# Patient Record
Sex: MALE | Race: WHITE | Employment: FULL TIME | URBAN - METROPOLITAN AREA
[De-identification: names, ages, dates, MRNs, and addresses within clinical notes are randomized per-mention and may not be internally consistent; named-entity substitution may affect disease eponyms.]

---

## 2018-05-31 ENCOUNTER — OFFICE VISIT (OUTPATIENT)
Dept: INTERNAL MEDICINE CLINIC | Age: 28
End: 2018-05-31

## 2018-05-31 VITALS
HEART RATE: 53 BPM | WEIGHT: 234 LBS | SYSTOLIC BLOOD PRESSURE: 100 MMHG | BODY MASS INDEX: 31.01 KG/M2 | HEIGHT: 73 IN | OXYGEN SATURATION: 98 % | DIASTOLIC BLOOD PRESSURE: 72 MMHG

## 2018-05-31 DIAGNOSIS — M25.562 CHRONIC PAIN OF LEFT KNEE: Primary | ICD-10-CM

## 2018-05-31 DIAGNOSIS — G89.29 CHRONIC PAIN OF LEFT KNEE: Primary | ICD-10-CM

## 2018-05-31 DIAGNOSIS — Z13.6 SCREENING FOR CARDIOVASCULAR CONDITION: ICD-10-CM

## 2018-05-31 DIAGNOSIS — F41.9 ANXIETY: ICD-10-CM

## 2018-05-31 DIAGNOSIS — F33.0 MILD EPISODE OF RECURRENT MAJOR DEPRESSIVE DISORDER (HCC): ICD-10-CM

## 2018-05-31 DIAGNOSIS — R53.83 FATIGUE, UNSPECIFIED TYPE: ICD-10-CM

## 2018-05-31 PROCEDURE — 99205 OFFICE O/P NEW HI 60 MIN: CPT | Performed by: FAMILY MEDICINE

## 2018-05-31 ASSESSMENT — PATIENT HEALTH QUESTIONNAIRE - PHQ9
1. LITTLE INTEREST OR PLEASURE IN DOING THINGS: 0
2. FEELING DOWN, DEPRESSED OR HOPELESS: 1
SUM OF ALL RESPONSES TO PHQ9 QUESTIONS 1 & 2: 1
SUM OF ALL RESPONSES TO PHQ QUESTIONS 1-9: 1

## 2018-05-31 NOTE — PROGRESS NOTES
Subjective:      Patient ID: Dillon Sanford is a 28 y.o. male. HPI  Left Knee Pain  For about 5 months. Hurt right ankle on New Year's taking out trash- 3 foot drop and landed on side of foot. Went back about 1 week later for knee pain/swelling and had effusion drained off at Urgent Care. Started wearing a brace, which really helps. No clicking, locking. Sometimes feels like it might give out. Depression/Anxiety  Long h/o depression, starting at about age 16-13. Never on meds, but he did see a psychiatrist weekly. \"Butterflies in stomach\", gassy, diarrhea. Drinks [employer] to help him focus at work. Mother just dx'd with anxiety disorder and sister was dx'd with ADD. Pt always struggled in school- poor grades. Review of Systems   Constitutional: Positive for fatigue. Negative for fever and unexpected weight change. HENT: Negative for congestion, ear pain, hearing loss, postnasal drip, rhinorrhea, sinus pressure, sore throat and trouble swallowing. Eyes: Negative for pain, discharge, redness and visual disturbance. Respiratory: Negative for cough, chest tightness, shortness of breath and wheezing. Cardiovascular: Negative for chest pain, palpitations and leg swelling. Gastrointestinal: Positive for diarrhea. Negative for abdominal pain, constipation, nausea and vomiting. Endocrine: Negative for cold intolerance, heat intolerance, polydipsia, polyphagia and polyuria. Genitourinary: Negative for dysuria, flank pain, hematuria and testicular pain. Musculoskeletal: Positive for arthralgias and joint swelling. Negative for back pain and myalgias. Skin: Negative for color change and rash. Allergic/Immunologic: Negative for environmental allergies, food allergies and immunocompromised state. Neurological: Negative for dizziness, seizures, syncope, numbness and headaches. Hematological: Negative for adenopathy.    Psychiatric/Behavioral: Positive for decreased concentration and dysphoric mood. Negative for agitation, confusion and sleep disturbance. The patient is nervous/anxious. Objective:   Physical Exam   Constitutional: He is oriented to person, place, and time. He appears well-developed and well-nourished. No distress. HENT:   Head: Normocephalic and atraumatic. Right Ear: External ear normal.   Left Ear: External ear normal.   Mouth/Throat: Oropharynx is clear and moist.   Eyes: Conjunctivae are normal. Pupils are equal, round, and reactive to light. Neck: Normal range of motion. Neck supple. Carotid bruit is not present. No tracheal deviation present. No thyromegaly present. Cardiovascular: Normal rate, regular rhythm, normal heart sounds and intact distal pulses. No murmur heard. Pulmonary/Chest: Effort normal and breath sounds normal. No respiratory distress. He has no wheezes. He has no rales. Abdominal: Soft. Bowel sounds are normal. He exhibits no distension. There is no tenderness. Musculoskeletal: Normal range of motion. He exhibits no edema or tenderness. Left knee: He exhibits swelling and effusion. He exhibits no erythema, no LCL laxity and no MCL laxity. No medial joint line and no lateral joint line tenderness noted. Lymphadenopathy:     He has no cervical adenopathy. Neurological: He is alert and oriented to person, place, and time. He has normal reflexes. Skin: Skin is warm and dry. No rash noted. Psychiatric: He has a normal mood and affect. His behavior is normal.   Nursing note and vitals reviewed. Vitals:    05/31/18 1024   BP: 100/72   Pulse: 53   SpO2: 98%   Weight: 234 lb (106.1 kg)   Height: 6' 1\" (1.854 m)     Wt Readings from Last 3 Encounters:   06/28/18 230 lb 3.2 oz (104.4 kg)   06/08/18 224 lb (101.6 kg)   05/31/18 234 lb (106.1 kg)     BP Readings from Last 3 Encounters:   06/28/18 110/68   06/08/18 123/78   05/31/18 100/72     Body mass index is 30.87 kg/m². Assessment:       Diagnosis Orders   1.  Chronic pain of left knee  Martín Franklin MD (General)   2. Mild episode of recurrent major depressive disorder ([facility])  TSH with Reflex    Folate    Vitamin B12    Vitamin D 25 Hydroxy   3. Anxiety  TSH with Reflex    Folate    Vitamin B12    Vitamin D 25 Hydroxy   4. Fatigue, unspecified type  TSH with Reflex    CBC Auto Differential    Comprehensive Metabolic Panel    Folate    Lipid Panel    Vitamin B12    Vitamin D 25 Hydroxy   5. Screening for cardiovascular condition  Comprehensive Metabolic Panel    Lipid Panel           Plan:      Simon Adame was seen today for establish care and joint swelling. Diagnoses and all orders for this visit:    Chronic pain of left knee  -     Martín Franklin MD (General)    Mild episode of recurrent major depressive disorder ([facility] [address]  -     TSH with Reflex; Future  -     Folate; Future  -     Vitamin B12; Future  -     Vitamin D 25 Hydroxy; Future  -     sertraline (ZOLOFT) 50 MG tablet; Take 1 tablet by mouth daily    Anxiety  -     TSH with Reflex; Future  -     Folate; Future  -     Vitamin B12; Future  -     Vitamin D 25 Hydroxy; Future    Fatigue, unspecified type  -     TSH with Reflex; Future  -     CBC Auto Differential; Future  -     Comprehensive Metabolic Panel; Future  -     Folate; Future  -     Lipid Panel; Future  -     Vitamin B12; Future  -     Vitamin D 25 Hydroxy; Future    Screening for cardiovascular condition  -     Comprehensive Metabolic Panel; Future  -     Lipid Panel; Future        Patient Instructions     Start Zoloft 50 mg- take half a pill per day for 1 week, then if tolerating well, increase to a whole pill per day. Consider getting GeneSight testing done. Check with insurance re: cost.  Schedule appointment with Montana Do, counselor in this office- [phone]. Schedule appointment with Dr. Jc Pan at [place] for knee- [phone]. Get fasting labs drawn soon.     60 minutes were spent with the patient face-to-face, over half of which were spent in counseling and discussion of plan of care. Return in about 4 weeks (around 6/28/2018) for recheck anxiety.

## 2018-05-31 NOTE — PATIENT INSTRUCTIONS
Start Zoloft 50 mg- take half a pill per day for 1 week, then if tolerating well, increase to a whole pill per day. Consider getting GeneSight testing done. Check with insurance re: cost.  Schedule appointment with Kat Bañuelos, counselor in this office- [phone]. Schedule appointment with Dr. Jackson Demarco at [place] for knee- [phone]. Get fasting labs drawn soon. Return in about 4 weeks (around 6/28/2018) for recheck anxiety. Patient Education        Anxiety Disorder: Care Instructions  Your Care Instructions    Anxiety is a normal reaction to stress. Difficult situations can cause you to have symptoms such as sweaty palms and a nervous feeling. In an anxiety disorder, the symptoms are far more severe. Constant worry, muscle tension, trouble sleeping, nausea and diarrhea, and other symptoms can make normal daily activities difficult or impossible. These symptoms may occur for no reason, and they can affect your work, school, or social life. Medicines, counseling, and self-care can all help. Follow-up care is a key part of your treatment and safety. Be sure to make and go to all appointments, and call your doctor if you are having problems. It's also a good idea to know your test results and keep a list of the medicines you take. How can you care for yourself at home? · Take medicines exactly as directed. Call your doctor if you think you are having a problem with your medicine. · Go to your counseling sessions and follow-up appointments. · Recognize and accept your anxiety. Then, when you are in a situation that makes you anxious, say to yourself, \"This is not an emergency. I feel uncomfortable, but I am not in danger. I can keep going even if I feel anxious. \"  · Be kind to your body:  ¨ Relieve tension with exercise or a massage. ¨ Get enough rest.  ¨ Avoid alcohol, caffeine, nicotine, and illegal drugs. They can increase your anxiety level and cause sleep problems.   ¨ Learn and do relaxation techniques. See below for more about these techniques. · Engage your mind. Get out and do something you enjoy. Go to a funny movie, or take a walk or hike. Plan your day. Having too much or too little to do can make you anxious. · Keep a record of your symptoms. Discuss your fears with a good friend or family member, or join a support group for people with similar problems. Talking to others sometimes relieves stress. · Get involved in social groups, or volunteer to help others. Being alone sometimes makes things seem worse than they are. · Get at least 30 minutes of exercise on most days of the week to relieve stress. Walking is a good choice. You also may want to do other activities, such as running, swimming, cycling, or playing tennis or team sports. Relaxation techniques  Do relaxation exercises 10 to 20 minutes a day. You can play soothing, relaxing music while you do them, if you wish. · Tell others in your house that you are going to do your relaxation exercises. Ask them not to disturb you. · Find a comfortable place, away from all distractions and noise. · Lie down on your back, or sit with your back straight. · Focus on your breathing. Make it slow and steady. · Breathe in through your nose. Breathe out through either your nose or mouth. · Breathe deeply, filling up the area between your navel and your rib cage. Breathe so that your belly goes up and down. · Do not hold your breath. · Breathe like this for 5 to 10 minutes. Notice the feeling of calmness throughout your whole body. As you continue to breathe slowly and deeply, relax by doing the following for another 5 to 10 minutes:  · Tighten and relax each muscle group in your body. You can begin at your toes and work your way up to your head. · Imagine your muscle groups relaxing and becoming heavy. · Empty your mind of all thoughts. · Let yourself relax more and more deeply.   · Become aware of the state of calmness that surrounds have questions about a medical condition or this instruction, always ask your healthcare professional. [place] [address] any warranty or liability for your use of this information.

## 2018-06-08 ENCOUNTER — OFFICE VISIT (OUTPATIENT)
Dept: ORTHOPEDIC SURGERY | Age: 28
End: 2018-06-08

## 2018-06-08 VITALS
SYSTOLIC BLOOD PRESSURE: 123 MMHG | BODY MASS INDEX: 28.75 KG/M2 | HEIGHT: 74 IN | WEIGHT: 224 LBS | DIASTOLIC BLOOD PRESSURE: 78 MMHG | HEART RATE: 47 BPM

## 2018-06-08 DIAGNOSIS — M25.562 PAIN IN JOINT OF LEFT KNEE: Primary | ICD-10-CM

## 2018-06-08 DIAGNOSIS — M25.462 EFFUSION OF LEFT KNEE: ICD-10-CM

## 2018-06-08 DIAGNOSIS — S83.242A TEAR OF MEDIAL MENISCUS OF LEFT KNEE, UNSPECIFIED TEAR TYPE, UNSPECIFIED WHETHER OLD OR CURRENT TEAR, INITIAL ENCOUNTER: ICD-10-CM

## 2018-06-08 PROCEDURE — 99243 OFF/OP CNSLTJ NEW/EST LOW 30: CPT | Performed by: PHYSICIAN ASSISTANT

## 2018-06-08 RX ORDER — DICLOFENAC SODIUM 75 MG/1
75 TABLET, DELAYED RELEASE ORAL 2 TIMES DAILY
Qty: 60 TABLET | Refills: 3 | Status: SHIPPED | OUTPATIENT
Start: 2018-06-08 | End: 2020-11-05 | Stop reason: CLARIF

## 2018-06-22 ENCOUNTER — OFFICE VISIT (OUTPATIENT)
Dept: ORTHOPEDIC SURGERY | Age: 28
End: 2018-06-22

## 2018-06-22 ENCOUNTER — TELEPHONE (OUTPATIENT)
Dept: ORTHOPEDIC SURGERY | Age: 28
End: 2018-06-22

## 2018-06-22 DIAGNOSIS — M25.462 EFFUSION OF LEFT KNEE: Primary | ICD-10-CM

## 2018-06-22 LAB
REASON FOR REJECTION: NORMAL
REJECTED TEST: NORMAL

## 2018-06-22 PROCEDURE — 99213 OFFICE O/P EST LOW 20 MIN: CPT | Performed by: PHYSICIAN ASSISTANT

## 2018-06-28 ENCOUNTER — OFFICE VISIT (OUTPATIENT)
Dept: INTERNAL MEDICINE CLINIC | Age: 28
End: 2018-06-28

## 2018-06-28 VITALS
HEIGHT: 73 IN | OXYGEN SATURATION: 98 % | SYSTOLIC BLOOD PRESSURE: 110 MMHG | WEIGHT: 230.2 LBS | HEART RATE: 50 BPM | DIASTOLIC BLOOD PRESSURE: 68 MMHG | BODY MASS INDEX: 30.51 KG/M2

## 2018-06-28 DIAGNOSIS — F41.9 ANXIETY: ICD-10-CM

## 2018-06-28 DIAGNOSIS — F33.0 MAJOR DEPRESSIVE DISORDER, RECURRENT, MILD (HCC): Primary | ICD-10-CM

## 2018-06-28 PROCEDURE — 99213 OFFICE O/P EST LOW 20 MIN: CPT | Performed by: FAMILY MEDICINE

## 2018-06-28 ASSESSMENT — ENCOUNTER SYMPTOMS
DIARRHEA: 0
EYE DISCHARGE: 0
EYE PAIN: 0
VOMITING: 0
CONSTIPATION: 0
CHEST TIGHTNESS: 0
WHEEZING: 0
COLOR CHANGE: 0
SORE THROAT: 0
TROUBLE SWALLOWING: 0
RHINORRHEA: 0
SINUS PRESSURE: 0
SHORTNESS OF BREATH: 0
NAUSEA: 0
ABDOMINAL PAIN: 0
EYE REDNESS: 0
BACK PAIN: 0
COUGH: 0

## 2018-06-29 ENCOUNTER — HOSPITAL ENCOUNTER (OUTPATIENT)
Dept: OTHER | Age: 28
Discharge: HOME | End: 2018-06-29
Attending: FAMILY MEDICINE | Admitting: FAMILY MEDICINE

## 2018-06-29 LAB
A/G RATIO: 1.6 (ref 1.1–2.2)
ALBUMIN SERPL-MCNC: 4.4 G/DL (ref 3.4–5)
ALP BLD-CCNC: 65 U/L (ref 40–129)
ALT SERPL-CCNC: 54 U/L (ref 10–40)
ANION GAP SERPL CALCULATED.3IONS-SCNC: 9 MMOL/L (ref 3–16)
AST SERPL-CCNC: 37 U/L (ref 15–37)
BASOPHILS ABSOLUTE: 0.1 K/UL (ref 0–0.2)
BASOPHILS RELATIVE PERCENT: 1 %
BILIRUB SERPL-MCNC: 0.5 MG/DL (ref 0–1)
BUN BLDV-MCNC: 16 MG/DL (ref 7–20)
CALCIUM SERPL-MCNC: 9 MG/DL (ref 8.3–10.6)
CHLORIDE BLD-SCNC: 105 MMOL/L (ref 99–110)
CHOLESTEROL, TOTAL: 147 MG/DL (ref 0–199)
CO2: 28 MMOL/L (ref 21–32)
CREAT SERPL-MCNC: 0.7 MG/DL (ref 0.9–1.3)
EOSINOPHILS ABSOLUTE: 0.2 K/UL (ref 0–0.6)
EOSINOPHILS RELATIVE PERCENT: 3.1 %
FOLATE: 9.53 NG/ML (ref 4.78–24.2)
GFR AFRICAN AMERICAN: >60
GFR NON-AFRICAN AMERICAN: >60
GLOBULIN: 2.7 G/DL
GLUCOSE BLD-MCNC: 93 MG/DL (ref 70–99)
HCT VFR BLD CALC: 42.8 % (ref 40.5–52.5)
HDLC SERPL-MCNC: 46 MG/DL (ref 40–60)
HEMOGLOBIN: 14.4 G/DL (ref 13.5–17.5)
LDL CHOLESTEROL CALCULATED: 88 MG/DL
LYMPHOCYTES ABSOLUTE: 1.9 K/UL (ref 1–5.1)
LYMPHOCYTES RELATIVE PERCENT: 34.9 %
MCH RBC QN AUTO: 28.9 PG (ref 26–34)
MCHC RBC AUTO-ENTMCNC: 33.6 G/DL (ref 31–36)
MCV RBC AUTO: 86.1 FL (ref 80–100)
MONOCYTES ABSOLUTE: 0.4 K/UL (ref 0–1.3)
MONOCYTES RELATIVE PERCENT: 6.7 %
NEUTROPHILS ABSOLUTE: 2.9 K/UL (ref 1.7–7.7)
NEUTROPHILS RELATIVE PERCENT: 54.3 %
PDW BLD-RTO: 13.8 % (ref 12.4–15.4)
PLATELET # BLD: 190 K/UL (ref 135–450)
PMV BLD AUTO: 10.7 FL (ref 5–10.5)
POTASSIUM SERPL-SCNC: 4.4 MMOL/L (ref 3.5–5.1)
RBC # BLD: 4.97 M/UL (ref 4.2–5.9)
SODIUM BLD-SCNC: 142 MMOL/L (ref 136–145)
TOTAL PROTEIN: 7.1 G/DL (ref 6.4–8.2)
TRIGL SERPL-MCNC: 66 MG/DL (ref 0–150)
TSH REFLEX: 2.03 UIU/ML (ref 0.27–4.2)
VITAMIN B-12: 427 PG/ML (ref 211–911)
VITAMIN D 25-HYDROXY: 21.7 NG/ML
VLDLC SERPL CALC-MCNC: 13 MG/DL
WBC # BLD: 5.4 K/UL (ref 4–11)

## 2018-07-02 ENCOUNTER — TELEPHONE (OUTPATIENT)
Dept: ORTHOPEDIC SURGERY | Age: 28
End: 2018-07-02

## 2018-07-02 NOTE — TELEPHONE ENCOUNTER
Patient's synovasure results to date were negative for infection. We are still waiting for final medial cultures which could take up to another week. Have spoke to the patient and he will attempt to get an appointment next week but if unable to we can give him the results over the phone.

## 2018-07-05 PROBLEM — E55.9 VITAMIN D DEFICIENCY: Status: ACTIVE | Noted: 2018-07-05

## 2018-07-09 ENCOUNTER — TELEPHONE (OUTPATIENT)
Dept: ORTHOPEDIC SURGERY | Age: 28
End: 2018-07-09

## 2018-07-09 ASSESSMENT — ENCOUNTER SYMPTOMS
COLOR CHANGE: 0
WHEEZING: 0
CHEST TIGHTNESS: 0
DIARRHEA: 1
EYE PAIN: 0
EYE REDNESS: 0
CONSTIPATION: 0
SORE THROAT: 0
TROUBLE SWALLOWING: 0
NAUSEA: 0
EYE DISCHARGE: 0
ABDOMINAL PAIN: 0
SINUS PRESSURE: 0
RHINORRHEA: 0
SHORTNESS OF BREATH: 0
VOMITING: 0
BACK PAIN: 0
COUGH: 0

## 2018-07-10 DIAGNOSIS — M22.2X2 PATELLOFEMORAL PAIN SYNDROME OF LEFT KNEE: Primary | ICD-10-CM

## 2018-08-28 ENCOUNTER — OFFICE VISIT (OUTPATIENT)
Dept: INTERNAL MEDICINE CLINIC | Age: 28
End: 2018-08-28

## 2018-08-28 VITALS
HEART RATE: 73 BPM | OXYGEN SATURATION: 97 % | WEIGHT: 229 LBS | BODY MASS INDEX: 30.21 KG/M2 | SYSTOLIC BLOOD PRESSURE: 126 MMHG | DIASTOLIC BLOOD PRESSURE: 82 MMHG

## 2018-08-28 DIAGNOSIS — F41.9 ANXIETY: ICD-10-CM

## 2018-08-28 DIAGNOSIS — F33.0 MAJOR DEPRESSIVE DISORDER, RECURRENT, MILD (HCC): Primary | ICD-10-CM

## 2018-08-28 DIAGNOSIS — S61.219A LACERATION OF FINGER WITHOUT FOREIGN BODY WITHOUT DAMAGE TO NAIL, UNSPECIFIED FINGER, UNSPECIFIED LATERALITY, INITIAL ENCOUNTER: ICD-10-CM

## 2018-08-28 PROCEDURE — 99213 OFFICE O/P EST LOW 20 MIN: CPT | Performed by: FAMILY MEDICINE

## 2018-08-28 RX ORDER — ESCITALOPRAM OXALATE 10 MG/1
10 TABLET ORAL DAILY
Qty: 30 TABLET | Refills: 1 | Status: SHIPPED | OUTPATIENT
Start: 2018-08-28 | End: 2020-11-05 | Stop reason: CLARIF

## 2018-08-28 ASSESSMENT — ENCOUNTER SYMPTOMS
SORE THROAT: 0
CHEST TIGHTNESS: 0
EYE DISCHARGE: 0
NAUSEA: 0
RHINORRHEA: 0
CONSTIPATION: 0
BACK PAIN: 0
VOMITING: 0
EYE PAIN: 0
DIARRHEA: 0
SINUS PRESSURE: 0
ABDOMINAL PAIN: 0
TROUBLE SWALLOWING: 0
EYE REDNESS: 0
WHEEZING: 0
COUGH: 0
COLOR CHANGE: 0
SHORTNESS OF BREATH: 0

## 2018-08-28 NOTE — PROGRESS NOTES
Gastrointestinal: Negative for abdominal pain, constipation, diarrhea, nausea and vomiting. Endocrine: Negative for cold intolerance, heat intolerance, polydipsia, polyphagia and polyuria. Genitourinary: Negative for dysuria, flank pain, hematuria and testicular pain. Musculoskeletal: Negative for arthralgias, back pain, joint swelling and myalgias. Skin: Positive for wound. Negative for color change and rash. Allergic/Immunologic: Negative for environmental allergies, food allergies and immunocompromised state. Neurological: Negative for dizziness, seizures, syncope, numbness and headaches. Hematological: Negative for adenopathy. Psychiatric/Behavioral: Positive for dysphoric mood. Negative for agitation, confusion and sleep disturbance. The patient is not nervous/anxious. Objective:   Physical Exam    Wt Readings from Last 3 Encounters:   08/28/18 229 lb (103.9 kg)   06/28/18 230 lb 3.2 oz (104.4 kg)   06/08/18 224 lb (101.6 kg)       BP Readings from Last 3 Encounters:   08/28/18 126/82   06/28/18 110/68   06/08/18 123/78       Vitals:    08/28/18 1519   BP: 126/82   Pulse: 73   SpO2: 97%       Physical Exam  Nursing note and vitals reviewed. Vitals:    08/28/18 1519   BP: 126/82   Site: Right Arm   Position: Sitting   Cuff Size: Large Adult   Pulse: 73   SpO2: 97%   Weight: 229 lb (103.9 kg)     Wt Readings from Last 3 Encounters:   08/28/18 229 lb (103.9 kg)   06/28/18 230 lb 3.2 oz (104.4 kg)   06/08/18 224 lb (101.6 kg)     BP Readings from Last 3 Encounters:   08/28/18 126/82   06/28/18 110/68   06/08/18 123/78     Body mass index is 30.21 kg/m². Constitutional: Patient appears well-developed and well-nourished. No distress. Head: Normocephalic and atraumatic. Oropharyngeal exam: mucous membranes moist, pharynx normal without lesions. Neck: Normal range of motion. Neck supple. No thyroidmegaly.    Cardiovascular: Normal rate, regular rhythm, normal heart sounds and intact

## 2018-08-31 ENCOUNTER — OFFICE VISIT (OUTPATIENT)
Dept: PSYCHOLOGY | Age: 28
End: 2018-08-31

## 2018-08-31 DIAGNOSIS — F33.1 MDD (MAJOR DEPRESSIVE DISORDER), RECURRENT EPISODE, MODERATE (HCC): Primary | ICD-10-CM

## 2018-08-31 PROCEDURE — 90791 PSYCH DIAGNOSTIC EVALUATION: CPT | Performed by: SOCIAL WORKER

## 2018-08-31 ASSESSMENT — PATIENT HEALTH QUESTIONNAIRE - PHQ9
5. POOR APPETITE OR OVEREATING: 0
1. LITTLE INTEREST OR PLEASURE IN DOING THINGS: 2
SUM OF ALL RESPONSES TO PHQ9 QUESTIONS 1 & 2: 4
SUM OF ALL RESPONSES TO PHQ QUESTIONS 1-9: 15
6. FEELING BAD ABOUT YOURSELF - OR THAT YOU ARE A FAILURE OR HAVE LET YOURSELF OR YOUR FAMILY DOWN: 3
4. FEELING TIRED OR HAVING LITTLE ENERGY: 2
8. MOVING OR SPEAKING SO SLOWLY THAT OTHER PEOPLE COULD HAVE NOTICED. OR THE OPPOSITE, BEING SO FIGETY OR RESTLESS THAT YOU HAVE BEEN MOVING AROUND A LOT MORE THAN USUAL: 3
SUM OF ALL RESPONSES TO PHQ QUESTIONS 1-9: 15
3. TROUBLE FALLING OR STAYING ASLEEP: 0
2. FEELING DOWN, DEPRESSED OR HOPELESS: 2
10. IF YOU CHECKED OFF ANY PROBLEMS, HOW DIFFICULT HAVE THESE PROBLEMS MADE IT FOR YOU TO DO YOUR WORK, TAKE CARE OF THINGS AT HOME, OR GET ALONG WITH OTHER PEOPLE: 2
7. TROUBLE CONCENTRATING ON THINGS, SUCH AS READING THE NEWSPAPER OR WATCHING TELEVISION: 3

## 2018-08-31 NOTE — PROGRESS NOTES
connections  Insight    Good  Judgment    Intact  Orientation    oriented to person, place, time, and general circumstances  Memory    recent and remote memory intact  Attention/Concentration    intact  Morbid ideation Yes  Suicide Assessment    no suicidal ideation      History:    Medications:   Current Outpatient Prescriptions   Medication Sig Dispense Refill    escitalopram (LEXAPRO) 10 MG tablet Take 1 tablet by mouth daily 30 tablet 1    sertraline (ZOLOFT) 50 MG tablet Take 1.5-2 tablets by mouth daily 60 tablet 5    diclofenac (VOLTAREN) 75 MG EC tablet Take 1 tablet by mouth 2 times daily 60 tablet 3    acetaminophen-codeine (TYLENOL/CODEINE #3) 300-30 MG per tablet Take 1 tablet by mouth every 4 hours as needed. 10 tablet 0     No current facility-administered medications for this visit. Social History:   Social History     Social History    Marital status:      Spouse name: N/A    Number of children: N/A    Years of education: N/A     Occupational History    patio room       Social History Main Topics    Smoking status: Never Smoker    Smokeless tobacco: Never Used    Alcohol use No    Drug use: No    Sexual activity: Yes     Partners: Female     Birth control/ protection: Condom     Other Topics Concern    Not on file     Social History Narrative    No narrative on file       TOBACCO:   reports that he has never smoked. He has never used smokeless tobacco.  ETOH:   reports that he does not drink alcohol. Family History:   Family History   Problem Relation Age of Onset    Other Mother    Lashawn Murguia Sclerosis Mother      [facility] Scores 8/31/2018 5/31/2018   PHQ2 Score 4 1   PHQ9 Score 15 1     Interpretation of Total Score Depression Severity: 1-4 = Minimal depression, 5-9 = Mild depression, 10-14 = Moderate depression, 15-19 = Moderately severe depression, 20-27 = Severe depression      A:  Pt endorses long hx of depression. Stress with work.   Irritability, lack of

## 2018-08-31 NOTE — PATIENT INSTRUCTIONS
depression. This may be different for everyone and it doesnt matter if its gardening, playing bridge, walking, reading a novel, or simply talking to a close friend. What matters is that YOU find the activity pleasurable! Even if you dont feel like doing something pleasurable for yourself, DO IT ANYWAY. We call this the fake it until you make it principle. Educate yourself! Often people feel powerless against medical conditions because they do not understand what is happening in their body. Just by reading this handout you know more than most people about depression. Knowledge is power when you can apply it, and make yourself feel better. *See recommended reading list at the bottom of this handout    Begin to notice unhealthy and unhelpful thoughts! In addition to how we behave, how we think influences our mood directly. Notice recurrent or alarming thoughts that have an impact on your mood. Ask yourself is this type of thinking helping me or hurting me?  if your answer is its hurting me here are some things you can do: *see disputation of negative thoughts section of handout  - Challenge the negative thought. Is it truly accurate? Wheres the proof? Become your own  and collect the facts.  - Reframe the negative thought. How can I think differently about this problem, situation, or view of myself? Allow yourself to view a situation from more than one angle, how might my spouse, friend, or someone I admire view this same problem?  - Use the best friend scenario. How would you help your best friend if he or she was having these same thoughts? Would you criticize him or her as harshly as you criticize yourself? Remember, YOU know YOU better than anyone else. You likely know what kinds of activities, thoughts and reinforcement you respond to. Doing whats easiest and most doable is the key. Pick 1 or 2 things that are easy and get started feeling better TODAY!     * Use the experiences that show that this thought might not be totally true? 12. If my best friend or someone I loved had this thought, what would I tell them? 15. If my best friend or someone I loved knew I was thinking this thought, what would they say to me? What evidence would they point out to me that would suggest that my thought is not completely true? 14. Are there strengths in me or positives in the situation that I am ignoring? Am I underestimating my ability to cope with unfortunate circumstances? 15. When I am not feeling this way, do I think about this situation any differently? How?  16. Have I been in this type of situation before? What happened? What have I learned from prior experiences that could help me now? 17. Five years from now, if I look back on this situation, will I look at it any differently? Will I focus on any different part of my experience? 25. Am I blaming myself for something over which I do not have complete control? OVERCOMING DEPRESSION    This booklet is designed to provide information about strategies for overcoming depression. It discusses a model or framework for understanding depression (the depression spiral) and presents an overview of treatment of depression, including use of medication and strategic coping strategies. The booklet is designed to be used with the assistance of your Behavioral Health Consultant. The Depression Spiral    The figure below depicts one helpful way to think about and understand depression. Our life experience (including depression) is influenced by a number of interrelated factors: our environment, biological factors, our thoughts and beliefs, our behaviors, and our emotions. Each factor can affect the others. For example, Lamine Villa recently began working in a fast-paced, high-pressure job (environmental factor). She began to have thoughts such as Theres no way I can get all this work done. Its impossible.   If I dont get it done, I may lose my job.   As a result, she began to work longer hours, cut out all fun activities, and withdraw from family and friends (behaviors). With this decrease in many of the positive, rewarding aspects of her life, she began to feel down, depressed, and more irritable (emotions). As the depression cycle started to take hold, she had more difficulty sleeping and concentrating (biology), which led to even more irritability and depression (emotions) and further withdrawal from activities and people (behaviors). At some point in the cycle, the balance of chemicals in her brain also began to alter (biology), which further deepened the spiral of depression. BREAKING THE DEPRESSION SPIRAL      As you can see, a variety of factors, including thoughts, behaviors, emotions, and environmental and biological factors can cause, maintain, and worsen depression. Fortunately, there are effective ways of breaking the spiral of depression. Since all the factors are interrelated (with one aspect affecting the others), even making small changes in just one or two areas can gradually lead to significant improvements in other areas. For example, Dinah Garcia noticed her worsening moods and decided to take action to break the depression spiral.  She focused first on one area that she felt would be easy to change:  her behaviors. Specifically, she wanted to make changes in how she spent her time in the evenings after work. She made a goal of spending 30 minutes each evening relaxing and doing fun activities with her family (behaviors). After several weeks, she noticed that she was beginning to feel lower levels of stress and her sleep began to improve (biological factor). Feeling more rested and better able to concentrate (biological factors) increased her belief that she could effectively manage the demands of her new job (thoughts). She noticed that she had fewer days of feeling down and depressed (emotions).       Other people working to improve their depression may choose to focus initially on other areas. Some people benefit from starting an antidepressant medication (biological factor) to begin to break the depression cycle. Others focus first on increasing regular physical exercise (a behavioral and biological factor that may help decrease depression), recognizing and changing thought patterns that contribute to depression or worry (thoughts), or learning and trying out new behaviors to improve work or family situations (behaviors, e.g., problem-solving, effective communication, time management, etc.). As you can see, there are a variety of coping methods and behavioral strategies that may be helpful in decreasing depression. It is probably not in your best interests to try all or too many strategies at any one time. Rather, keep it simple and do not overwhelm yourself. It is usually best to pick one or two strategies that sound most relevant to you, try those coping strategies for a few weeks or longer, and then move on to other coping strategies that you think may be important later on. And remember -- even if you are just working directly on one or two coping strategies, you will probably be having an indirect positive effect on other areas. Your Behavioral Health Consultant [facility]) can work with you to develop skills in some of the most effective strategies for breaking the depression spiral and decreasing depression. Four effective strategies include:    _____  a. Increasing rewarding activities    _____  b. Taking antidepressant medication as directed by PCP     _____  c. Increasing physical exercise     _____  d. Increasing balanced thinking     Talk with your [facility] about which of the above you are interested in working on to better manage your depression.

## 2018-09-24 ENCOUNTER — OFFICE VISIT (OUTPATIENT)
Dept: PSYCHOLOGY | Age: 28
End: 2018-09-24
Payer: COMMERCIAL

## 2018-09-24 DIAGNOSIS — F33.1 MDD (MAJOR DEPRESSIVE DISORDER), RECURRENT EPISODE, MODERATE (HCC): Primary | ICD-10-CM

## 2018-09-24 PROCEDURE — 99999 PR OFFICE/OUTPT VISIT,PROCEDURE ONLY: CPT | Performed by: SOCIAL WORKER

## 2018-09-24 PROCEDURE — 90832 PSYTX W PT 30 MINUTES: CPT | Performed by: SOCIAL WORKER

## 2018-09-24 ASSESSMENT — PATIENT HEALTH QUESTIONNAIRE - PHQ9
SUM OF ALL RESPONSES TO PHQ QUESTIONS 1-9: 16
3. TROUBLE FALLING OR STAYING ASLEEP: 2
4. FEELING TIRED OR HAVING LITTLE ENERGY: 3
5. POOR APPETITE OR OVEREATING: 0
7. TROUBLE CONCENTRATING ON THINGS, SUCH AS READING THE NEWSPAPER OR WATCHING TELEVISION: 2
SUM OF ALL RESPONSES TO PHQ QUESTIONS 1-9: 16
2. FEELING DOWN, DEPRESSED OR HOPELESS: 2
8. MOVING OR SPEAKING SO SLOWLY THAT OTHER PEOPLE COULD HAVE NOTICED. OR THE OPPOSITE, BEING SO FIGETY OR RESTLESS THAT YOU HAVE BEEN MOVING AROUND A LOT MORE THAN USUAL: 2
SUM OF ALL RESPONSES TO PHQ9 QUESTIONS 1 & 2: 3
1. LITTLE INTEREST OR PLEASURE IN DOING THINGS: 1
10. IF YOU CHECKED OFF ANY PROBLEMS, HOW DIFFICULT HAVE THESE PROBLEMS MADE IT FOR YOU TO DO YOUR WORK, TAKE CARE OF THINGS AT HOME, OR GET ALONG WITH OTHER PEOPLE: 2
9. THOUGHTS THAT YOU WOULD BE BETTER OFF DEAD, OR OF HURTING YOURSELF: 2
6. FEELING BAD ABOUT YOURSELF - OR THAT YOU ARE A FAILURE OR HAVE LET YOURSELF OR YOUR FAMILY DOWN: 2

## 2018-09-24 NOTE — PROGRESS NOTES
intact  Attention/Concentration    intact  Morbid ideation No  Suicide Assessment    no suicidal ideation      History:    Medications:   Current Outpatient Prescriptions   Medication Sig Dispense Refill    escitalopram (LEXAPRO) 10 MG tablet Take 1 tablet by mouth daily 30 tablet 1    sertraline (ZOLOFT) 50 MG tablet Take 1.5-2 tablets by mouth daily 60 tablet 5    diclofenac (VOLTAREN) 75 MG EC tablet Take 1 tablet by mouth 2 times daily 60 tablet 3    acetaminophen-codeine (TYLENOL/CODEINE #3) 300-30 MG per tablet Take 1 tablet by mouth every 4 hours as needed. 10 tablet 0     No current facility-administered medications for this visit. Social History:   Social History     Social History    Marital status:      Spouse name: N/A    Number of children: N/A    Years of education: N/A     Occupational History    patio room       Social History Main Topics    Smoking status: Never Smoker    Smokeless tobacco: Never Used    Alcohol use No    Drug use: No    Sexual activity: Yes     Partners: Female     Birth control/ protection: Condom     Other Topics Concern    Not on file     Social History Narrative    No narrative on file       TOBACCO:   reports that he has never smoked. He has never used smokeless tobacco.  ETOH:   reports that he does not drink alcohol. Family History:   Family History   Problem Relation Age of Onset    Other Mother    Stephanie Vuong Sclerosis Mother      [facility] Scores 9/24/2018 8/31/2018 5/31/2018   PHQ2 Score 3 4 1   PHQ9 Score 16 15 1     Interpretation of Total Score Depression Severity: 1-4 = Minimal depression, 5-9 = Mild depression, 10-14 = Moderate depression, 15-19 = Moderately severe depression, 20-27 = Severe depression      A:  Pt endorses mood has been a little worse past two weeks. More irritability, may be medication related. Interested in [employer] testing and will explore with insurance. No SI/HI. Insight and motivation good. Diagnosis:  Major depressive disorder; recurrent and moderate  Generalized anxiety disorder      Past Diagnosis:      Diagnosis Date    Anxiety 6/28/2018    Depression        No diagnosis found.       Plan:  Pt interventions:  Provided handout on  cog distortions, Trained in strategies for increasing balanced thinking, Discussed and set plan for behavioral activation, Discussed self-care (sleep, nutrition, rewarding activities, social support, exercise), Motivational Interviewing to increase patient confidence and compliance with adhering to behavioral change plan, Motivational Interviewing to determine importance and readiness for change, Supportive techniques and Identified maladaptive thoughts      Pt Behavioral Change Plan:  See Pt Instructions

## 2018-09-24 NOTE — Clinical Note
Pt interested in genesight testing. Not sure if he is having SE on new meds, but will stick out until he sees you next week to see if they resolve.

## 2018-09-24 NOTE — PATIENT INSTRUCTIONS
by any eligible woman on the face of the earth. Mental Filter is when you pick out a negative detail in any situation and dwell on it exclusively, thus perceiving that the whole situation is negative. E.g. A depressed young college student overheard some other students making fun of her best friend. She became furious because she was thinking \"That is what the human race is basically like-cruel and insensitive! \"    She was overlooking the fact that in the previous months, few people, if any, had been cruel or insensitive to her. When you are depressed, you wear a pair of eyeglasses with special lenses that filter out any positive. All that you allow to enter your conscious mind is negative. Because you are not aware of this \"filtering process\", you conclude that everything is negative. This is known as \"selective abstraction\". It is a bad habit that will cause you to suffer much needless anguish. Disqualifying the Positive is the persistent tendency of some individuals to transform neutral or even positive experiences into negative ones. You don't just ignore positive experiences, you cleverly and swiftly turn them into nightmarish opposites. E.g. An everyday example of this would be the way most of us have been conditioned to respond to compliments. When someone praises your appearance or your work, you might automatically tell yourself, \"they're just being nice. \"  With one prasad blow, you mentally disqualify their compliment. You do the same thing when you tell them, \"Oh, it was nothing, really. \"    If you constantly throw cold water on the good things that happen, no wonder life seems damp and chilly to you! Disqualifying the positive is one of the most destructive forms of cognitive distortions. Whenever you have a negative experience, you dwell on it and conclude \"That proves what I've known all along. \"  In contrast, when you have a positive experience, you tell expectations to approximate reality or always feel let down by human behavior. When you direct should statements towards others, you will usually feel frustrated. Labeling and Mislabeling. Personal labeling means creating a completely negative self image based on your errors. It's an extreme form of overgeneralization. There is a good chance you are involved in a personal labeling whenever you describe your mistakes with sentences beginning with \"I'm a . [facility] \"      E.g. When you miss your putt on the eighteenth hole, you might say \"I'm a born loser\" instead of \"I goofed up on my putt. \"      Labeling yourself is not only self defeating, it is irrational.  Your self cannot be equated with any one thing you do. Your life is a complex and ever-changing flow and thoughts, emotions, and actions. Stop trying to define yourself with negative labels-they are overly simplistic and wrong. Would you think of yourself as an \"eater\" simply because you eat. When you label other people, you will invariably generate hostility.      E.g. A boss who sees his occasionally irritable  as Gissel Shaquillewill uncooperative bitch. \"  Because of this label, he resents her and jumps at every chance to criticize her. She in turn, labels him as \"insensitive chauvinist\" and complains about him at every opportunity. So, around and around they go at each other's throats, focusing on every weakness or imperfection as proof of the other's worthlessness. Mislabeling involves describing an event with words that are inaccurate and emotionally heavily loaded. For example, a woman on a diet ate a dish of ice cream and thought \"how disgusting and repulsive of me. I'm a pig. \" These thoughts made her so upset she ate the whole quart of ice cream.

## 2020-11-05 ENCOUNTER — OFFICE VISIT (OUTPATIENT)
Dept: PRIMARY CARE CLINIC | Age: 30
End: 2020-11-05

## 2020-11-05 ENCOUNTER — NURSE TRIAGE (OUTPATIENT)
Dept: OTHER | Facility: CLINIC | Age: 30
End: 2020-11-05

## 2020-11-05 ENCOUNTER — VIRTUAL VISIT (OUTPATIENT)
Dept: INTERNAL MEDICINE CLINIC | Age: 30
End: 2020-11-05
Payer: COMMERCIAL

## 2020-11-05 LAB — S PYO AG THROAT QL: POSITIVE

## 2020-11-05 PROCEDURE — 87880 STREP A ASSAY W/OPTIC: CPT | Performed by: NURSE PRACTITIONER

## 2020-11-05 PROCEDURE — 99212 OFFICE O/P EST SF 10 MIN: CPT | Performed by: NURSE PRACTITIONER

## 2020-11-05 PROCEDURE — 99999 PR OFFICE/OUTPT VISIT,PROCEDURE ONLY: CPT | Performed by: NURSE PRACTITIONER

## 2020-11-05 RX ORDER — PENICILLIN V POTASSIUM 500 MG/1
500 TABLET ORAL 4 TIMES DAILY
Qty: 40 TABLET | Refills: 0 | Status: SHIPPED | OUTPATIENT
Start: 2020-11-05 | End: 2020-11-15

## 2020-11-05 ASSESSMENT — ENCOUNTER SYMPTOMS
WHEEZING: 0
SHORTNESS OF BREATH: 0
NAUSEA: 0
DIARRHEA: 0
VOMITING: 0
EYE DISCHARGE: 0
BLOOD IN STOOL: 0
CONSTIPATION: 0
COUGH: 0
ABDOMINAL PAIN: 0
SORE THROAT: 1

## 2020-11-05 NOTE — PATIENT INSTRUCTIONS

## 2020-11-05 NOTE — PROGRESS NOTES
2020    TELEHEALTH EVALUATION -- Audio/Visual (During QKJSL-06 public health emergency)    HPI:    Sabrina Severs (:  1990) has requested an audio/video evaluation for the following concern(s):    Pt is seen for complaint of sore throat and fever since yesterday. Admits to a slight cough from throat tickle, no mucus production. Fever from 99.9 to 101.2. Pain from throat rated 4/10. He has used no OTC remedies. His work is requiring a COVID test due to his sx. He is not on any current daily medication. Review of Systems   Constitutional: Positive for fever. HENT: Positive for sore throat. Negative for congestion. Eyes: Negative for discharge. Respiratory: Negative for cough, shortness of breath and wheezing. Cardiovascular: Negative for chest pain, palpitations and leg swelling. Gastrointestinal: Negative for abdominal pain, blood in stool, constipation, diarrhea, nausea and vomiting. Genitourinary: Negative for dysuria and hematuria. Musculoskeletal: Negative for myalgias. Skin: Negative for rash. Neurological: Negative for dizziness. Psychiatric/Behavioral: The patient is not nervous/anxious. Prior to Visit Medications    Not on File       Social History     Tobacco Use    Smoking status: Never Smoker    Smokeless tobacco: Never Used   Substance Use Topics    Alcohol use: No    Drug use:  No            PHYSICAL EXAMINATION:  [ INSTRUCTIONS:  \"[x]\" Indicates a positive item  \"[]\" Indicates a negative item  -- DELETE ALL ITEMS NOT EXAMINED]  Vital Signs: (As obtained by patient/caregiver or practitioner observation)    Blood pressure-  Heart rate-    Respiratory rate-    Temperature-  Pulse oximetry-     Constitutional: [x] Appears well-developed and well-nourished [] No apparent distress      [] Abnormal-   Mental status  [x] Alert and awake  [x] Oriented to person/place/time [x]Able to follow commands      Eyes:  EOM    []  Normal  [] Abnormal-  Sclera  [x] Visit was conducted with patient's (and/or legal guardian's) consent, to reduce the patient's risk of exposure to COVID-19 and provide necessary medical care. The patient (and/or legal guardian) has also been advised to contact this office for worsening conditions or problems, and seek emergency medical treatment and/or call 911 if deemed necessary. Patient identification was verified at the start of the visit: Yes    Total time spent on this encounter: Not billed by time    Services were provided through a video synchronous discussion virtually to substitute for in-person clinic visit. Patient and provider were located at their individual homes. --KATRIN Campbell CNP on 11/5/2020 at 8:00 AM    An electronic signature was used to authenticate this note.

## 2020-11-05 NOTE — PROGRESS NOTES
Blane Albright received a viral test for COVID-19. They were educated on isolation and quarantine as appropriate. For any symptoms, they were directed to seek care from their PCP, given contact information to establish with a doctor, directed to an urgent care or the emergency room.

## 2020-11-06 LAB — SARS-COV-2, NAA: NOT DETECTED

## 2021-03-30 ENCOUNTER — OFFICE VISIT (OUTPATIENT)
Dept: INTERNAL MEDICINE CLINIC | Age: 31
End: 2021-03-30
Payer: COMMERCIAL

## 2021-03-30 VITALS
DIASTOLIC BLOOD PRESSURE: 72 MMHG | HEIGHT: 73 IN | OXYGEN SATURATION: 98 % | SYSTOLIC BLOOD PRESSURE: 120 MMHG | TEMPERATURE: 97.2 F | WEIGHT: 255 LBS | HEART RATE: 53 BPM | BODY MASS INDEX: 33.8 KG/M2

## 2021-03-30 DIAGNOSIS — G47.30 SLEEP APNEA, UNSPECIFIED TYPE: ICD-10-CM

## 2021-03-30 DIAGNOSIS — K21.9 GASTROESOPHAGEAL REFLUX DISEASE, UNSPECIFIED WHETHER ESOPHAGITIS PRESENT: ICD-10-CM

## 2021-03-30 DIAGNOSIS — F33.1 MAJOR DEPRESSIVE DISORDER, RECURRENT EPISODE, MODERATE (HCC): Primary | ICD-10-CM

## 2021-03-30 DIAGNOSIS — R06.83 SNORING: ICD-10-CM

## 2021-03-30 DIAGNOSIS — F41.1 GAD (GENERALIZED ANXIETY DISORDER): ICD-10-CM

## 2021-03-30 PROCEDURE — 99214 OFFICE O/P EST MOD 30 MIN: CPT | Performed by: FAMILY MEDICINE

## 2021-03-30 RX ORDER — ESCITALOPRAM OXALATE 10 MG/1
10 TABLET ORAL DAILY
Qty: 30 TABLET | Refills: 1 | Status: SHIPPED | OUTPATIENT
Start: 2021-03-30 | End: 2021-05-03 | Stop reason: SDUPTHER

## 2021-03-30 SDOH — ECONOMIC STABILITY: INCOME INSECURITY: HOW HARD IS IT FOR YOU TO PAY FOR THE VERY BASICS LIKE FOOD, HOUSING, MEDICAL CARE, AND HEATING?: NOT HARD AT ALL

## 2021-03-30 SDOH — ECONOMIC STABILITY: FOOD INSECURITY: WITHIN THE PAST 12 MONTHS, YOU WORRIED THAT YOUR FOOD WOULD RUN OUT BEFORE YOU GOT MONEY TO BUY MORE.: NEVER TRUE

## 2021-03-30 SDOH — ECONOMIC STABILITY: FOOD INSECURITY: WITHIN THE PAST 12 MONTHS, THE FOOD YOU BOUGHT JUST DIDN'T LAST AND YOU DIDN'T HAVE MONEY TO GET MORE.: NEVER TRUE

## 2021-03-30 ASSESSMENT — ENCOUNTER SYMPTOMS
DIARRHEA: 0
EYE REDNESS: 0
SHORTNESS OF BREATH: 0
APNEA: 1
ABDOMINAL PAIN: 0
COLOR CHANGE: 0
WHEEZING: 0
SORE THROAT: 0
SINUS PRESSURE: 0
RHINORRHEA: 0
VOMITING: 0
EYE PAIN: 0
CONSTIPATION: 0
COUGH: 0
EYE DISCHARGE: 0
BACK PAIN: 0
TROUBLE SWALLOWING: 0
NAUSEA: 0
CHEST TIGHTNESS: 1

## 2021-03-30 NOTE — PATIENT INSTRUCTIONS
Schedule appointment with [address]  Start Lexapro 10 mg per day. Let me know if the heartburn worsens, and I will send in a prescription medication for you. Return in about 4 weeks (around 4/27/2021) for Recheck moods (in 4-6 weeks). Patient Education        Gastroesophageal Reflux Disease (GERD): Care Instructions  Overview     Gastroesophageal reflux disease (GERD) is the backward flow of stomach acid into the esophagus. The esophagus is the tube that leads from your throat to your stomach. A one-way valve prevents the stomach acid from backing up into this tube. But when you have GERD, this valve does not close tightly enough. This can also cause pain and swelling in your esophagus. (This is called esophagitis.)  If you have mild GERD symptoms including heartburn, you may be able to control the problem with antacids or over-the-counter medicine. You can also make lifestyle changes to help reduce your symptoms. These include changing your diet and eating habits, such as not eating late at night and losing weight. Follow-up care is a key part of your treatment and safety. Be sure to make and go to all appointments, and call your doctor if you are having problems. It's also a good idea to know your test results and keep a list of the medicines you take. How can you care for yourself at home? · Take your medicines exactly as prescribed. Call your doctor if you think you are having a problem with your medicine. · Your doctor may recommend over-the-counter medicine. For mild or occasional indigestion, antacids, such as Tums, Gaviscon, Mylanta, or Maalox, may help. Your doctor also may recommend over-the-counter acid reducers, such as famotidine (Pepcid AC), cimetidine (Tagamet HB), or omeprazole (Prilosec). Read and follow all instructions on the label. If you use these medicines often, talk with your doctor. · Change your eating habits.   ? It's best to eat several small meals instead of two or three large meals. ? After you eat, wait 2 to 3 hours before you lie down. ? Chocolate, mint, and alcohol can make GERD worse. ? Spicy foods, foods that have a lot of acid (like tomatoes and oranges), and coffee can make GERD symptoms worse in some people. If your symptoms are worse after you eat a certain food, you may want to stop eating that food to see if your symptoms get better. · Do not smoke or chew tobacco. Smoking can make GERD worse. If you need help quitting, talk to your doctor about stop-smoking programs and medicines. These can increase your chances of quitting for good. · If you have GERD symptoms at night, raise the head of your bed 6 to 8 inches by putting the frame on blocks or placing a foam wedge under the head of your mattress. (Adding extra pillows does not work.)  · Do not wear tight clothing around your middle. · Lose weight if you need to. Losing just 5 to 10 pounds can help. When should you call for help? Call your doctor now or seek immediate medical care if:    · You have new or different belly pain.     · Your stools are black and tarlike or have streaks of blood. Watch closely for changes in your health, and be sure to contact your doctor if:    · Your symptoms have not improved after 2 days.     · Food seems to catch in your throat or chest.   Where can you learn more? Go to https://[email or web address]. org and sign in to your [employer] account. Enter J347 in the [facility] box to learn more about \"Gastroesophageal Reflux Disease (GERD): Care Instructions. \"     If you do not have an account, please click on the \"Sign Up Now\" link. Current as of: April 15, 2020               Content Version: 12.8  © 2006-2021 Healthwise, [employer]. Care instructions adapted under license by [facility] ([facility]).  If you have questions about a medical condition or this instruction, always ask your healthcare professional. James Saxena any

## 2021-03-30 NOTE — PROGRESS NOTES
Subjective:      Patient ID: Elizabeth Rosenberg is a 31 y.o.male who is being seen for   Chief Complaint   Patient presents with    Depression     discuss restarting medication    Anxiety    Heartburn    Sleep Apnea       HPI  Mood Disorder:  Patient presents for follow-up of depression and anxiety disorder. Current complaints include: anhedonia, depressed mood, feelings of hopelessness, feelings of worthlessness/excessive guilt, fatigue, irritability and excessive worry. Anxiety has been worse, but also feeling very depressed. Decreased motivation. Finds that he has to keep busy in order to not think. C/O always worrying, having intrusive thoughts  Denies SI  A little lower energy level. Sleeping well, about 6 hours per night    He denies increased use of drugs or alcohol and suicidal thoughts or behavior. Symptoms/signs of shlomo: none. External stressors: furloughed from job off and on for the past year, has 9 month old daughter whom he is really enjoying but can be stressful at times too. Current treatment includes: none. Medication side effects: N/A. Has previously tried Zoloft (name brand worked but didn't think it worked as well when switched to generic) and Lexapro. Has been having more heartburn recently. Changed diet which helped but still taking Tums about twice a week. Trying to not eat within 3 hours of bedtime. Wife has noticed that he stops breathing at night sometimes. +snoring. These sx are worse when he has heartburn. Has awakened once from regurgitating into his mouth. Patient's medications, past medical, surgical, social, and family historieswere reviewed by me personally and updated as appropriate.       Outpatient Medications Marked as Taking for the 3/30/21 encounter (Office Visit) with Lakisha Negron MD   Medication Sig Dispense Refill    escitalopram (LEXAPRO) 10 MG tablet Take 1 tablet by mouth daily 30 tablet 1       Review of Systems  Review of Systems Constitutional: Positive for fatigue. Negative for fever and unexpected weight change. HENT: Negative for congestion, ear pain, hearing loss, postnasal drip, rhinorrhea, sinus pressure, sore throat and trouble swallowing. Snoring   Eyes: Negative for pain, discharge, redness and visual disturbance. Respiratory: Positive for apnea (during sleep) and chest tightness (with anxiety). Negative for cough, shortness of breath and wheezing. Cardiovascular: Negative for chest pain, palpitations and leg swelling. Gastrointestinal: Negative for abdominal pain, constipation, diarrhea, nausea and vomiting. Heartburn   Endocrine: Negative for cold intolerance, heat intolerance, polydipsia, polyphagia and polyuria. Genitourinary: Negative for dysuria, flank pain, hematuria and testicular pain. Musculoskeletal: Negative for arthralgias, back pain, joint swelling and myalgias. Skin: Negative for color change and rash. Allergic/Immunologic: Negative for environmental allergies, food allergies and immunocompromised state. Neurological: Negative for dizziness, seizures, syncope, numbness and headaches. Hematological: Negative for adenopathy. Psychiatric/Behavioral: Positive for agitation and dysphoric mood. Negative for confusion and sleep disturbance. The patient is nervous/anxious. Objective:   Physical Exam    Wt Readings from Last 3 Encounters:   03/30/21 255 lb (115.7 kg)   08/28/18 229 lb (103.9 kg)   06/28/18 230 lb 3.2 oz (104.4 kg)       BP Readings from Last 3 Encounters:   03/30/21 120/72   08/28/18 126/82   06/28/18 110/68       Vitals:    03/30/21 1255   BP: 120/72   Pulse: 53   Temp: 97.2 °F (36.2 °C)   SpO2: 98%       Physical Exam  Nursing note and vitals reviewed.     Vitals:    03/30/21 1255   BP: 120/72   Site: Left Upper Arm   Position: Sitting   Pulse: 53   Temp: 97.2 °F (36.2 °C)   TempSrc: Infrared   SpO2: 98%   Weight: 255 lb (115.7 kg)   Height: 6' 1\" (1.854 m) Wt Readings from Last 3 Encounters:   03/30/21 255 lb (115.7 kg)   08/28/18 229 lb (103.9 kg)   06/28/18 230 lb 3.2 oz (104.4 kg)     BP Readings from Last 3 Encounters:   03/30/21 120/72   08/28/18 126/82   06/28/18 110/68     Body mass index is 33.64 kg/m². Constitutional: Patient appearswell-developed and well-nourished. No distress. Head: Normocephalic and atraumatic. Ears: Normal bilateral external ears, ear canals, and tympanic membranes    Oropharyngeal exam: mucous membranes moist, pharynx normal without lesions. Neck: Normal range of motion. Neck supple. Nothyroidmegaly. No bruits. Cardiovascular: Normal rate, regular rhythm, normal heart sounds and intact distal pulses. Pulmonary/Chest: Effort normal and breath sounds normal. No stridor. No respiratory distress. No wheezes andno rales. Abdominal: Soft. Bowel sounds are normal. No distension and no mass. No tenderness. No rebound and no guarding. Musculoskeletal: No edema and no tenderness. Skin: No rash or erythema. Psychiatric: Normal mood and affect. Behavior is normal.       Assessment       Diagnosis Orders   1. Major depressive disorder, recurrent episode, moderate (HCC)  escitalopram (LEXAPRO) 10 MG tablet   2. RAHUL (generalized anxiety disorder)  escitalopram (LEXAPRO) 10 MG tablet   3. [facility]  [facility]   4. Sleep apnea, unspecified type  [facility]   5. Gastroesophageal reflux disease, unspecified whether esophagitis present              Plan      Brittaney Flores was seen today for depression, anxiety, heartburn and sleep apnea. Diagnoses and all orders for this visit:    Major depressive disorder, recurrent episode, moderate (HCC)  -     escitalopram (LEXAPRO) 10 MG tablet; Take 1 tablet by mouth daily    RAHUL (generalized anxiety disorder)  -     escitalopram (LEXAPRO) 10 MG tablet;  Take 1 tablet by mouth daily    Snoring  -     [facility]    Sleep apnea, unspecified type  -     [id number] [facility]    Gastroesophageal reflux disease, unspecified whether esophagitis present  Discussed diet changes, elevating HOB. Pt declined Rx med at this time, but will call if sx worsen. Patient education materials given in AVS.     Patient Instructions     Schedule appointment with [address] [employer]  Start Lexapro 10 mg per day. Let me know if the heartburn worsens, and I will send in a prescription medication for you. Return in about 4 weeks (around 4/27/2021) for Recheck moods (in 4-6 weeks).     Time spent on encounter: 35 minutes

## 2021-05-06 ENCOUNTER — OFFICE VISIT (OUTPATIENT)
Dept: INTERNAL MEDICINE CLINIC | Age: 31
End: 2021-05-06
Payer: COMMERCIAL

## 2021-05-06 VITALS
OXYGEN SATURATION: 98 % | BODY MASS INDEX: 32.98 KG/M2 | HEART RATE: 77 BPM | TEMPERATURE: 97.3 F | DIASTOLIC BLOOD PRESSURE: 80 MMHG | SYSTOLIC BLOOD PRESSURE: 118 MMHG | WEIGHT: 250 LBS

## 2021-05-06 DIAGNOSIS — F33.1 MAJOR DEPRESSIVE DISORDER, RECURRENT EPISODE, MODERATE (HCC): Primary | ICD-10-CM

## 2021-05-06 DIAGNOSIS — F41.1 GAD (GENERALIZED ANXIETY DISORDER): ICD-10-CM

## 2021-05-06 DIAGNOSIS — L60.9 NAIL ABNORMALITIES: ICD-10-CM

## 2021-05-06 PROCEDURE — 99214 OFFICE O/P EST MOD 30 MIN: CPT | Performed by: FAMILY MEDICINE

## 2021-05-06 RX ORDER — ESCITALOPRAM OXALATE 20 MG/1
20 TABLET ORAL DAILY
Qty: 30 TABLET | Refills: 5 | Status: SHIPPED | OUTPATIENT
Start: 2021-05-06 | End: 2022-10-13 | Stop reason: SDUPTHER

## 2021-05-06 ASSESSMENT — ENCOUNTER SYMPTOMS
SORE THROAT: 0
TROUBLE SWALLOWING: 0
ABDOMINAL PAIN: 0
SINUS PRESSURE: 0
VOMITING: 0
SHORTNESS OF BREATH: 0
COUGH: 0
BACK PAIN: 0
COLOR CHANGE: 0
CONSTIPATION: 0
WHEEZING: 0
RHINORRHEA: 0
EYE PAIN: 0
EYE REDNESS: 0
NAUSEA: 0
EYE DISCHARGE: 0
DIARRHEA: 0
CHEST TIGHTNESS: 0

## 2021-05-06 NOTE — PROGRESS NOTES
Subjective:      Patient ID: Anatoliy Mirza is a 31 y.o.male who is being seen for   Chief Complaint   Patient presents with    1 Month Follow-Up     moods       HPI  Mood Disorder:  Patient presents for follow-up of depression and anxiety disorder. Current complaints include: depressed mood and excessive worry. He denies increased use of drugs or alcohol and suicidal thoughts or behavior. Symptoms/signs of shlomo: none. External stressors: nothing new. Current treatment includes: Lexapro- 10 qd. Sx better on med, and wife told him that she has also noticed improvement; however, pt thinks that sx could be better and that he would benefit from higher med dose. Had 2 days off med and \"felt like a zombie\" but restarted it yesterday. Medication side effects: none. Had 1 week of not being able to sleep but then it resolved. Not convinced it was related to Lexapro. Also c/o discoloration on medial left great toenail. He tends to get ingrown toenails and then will \"dig them out. \" Did this a few months ago and when it grew back it was discolored. Patient's medications, past medical, surgical, social, and family historieswere reviewed by me personally and updated as appropriate. Outpatient Medications Marked as Taking for the 5/6/21 encounter (Office Visit) with Saskia Villatoro MD   Medication Sig Dispense Refill    escitalopram (LEXAPRO) 20 MG tablet Take 1 tablet by mouth daily 30 tablet 5       Review of Systems  Review of Systems   Constitutional: Negative for fatigue, fever and unexpected weight change. HENT: Negative for congestion, ear pain, hearing loss, postnasal drip, rhinorrhea, sinus pressure, sore throat and trouble swallowing. Eyes: Negative for pain, discharge, redness and visual disturbance. Respiratory: Negative for cough, chest tightness, shortness of breath and wheezing. Cardiovascular: Negative for chest pain, palpitations and leg swelling.    Gastrointestinal:

## 2021-05-06 NOTE — PATIENT INSTRUCTIONS
active  · Stay busy and get outside. Take a walk, or try some other light exercise. · Talk with your doctor about an exercise program. Exercise can help with mild depression. · Go to a movie or concert. Take part in a Voodoo activity or other social gathering. Go to a ball game. · Ask a friend to have dinner with you. Take care of yourself  · Eat a balanced diet with plenty of fresh fruits and vegetables, whole grains, and lean protein. If you have lost your appetite, eat small snacks rather than large meals. · Avoid using illegal drugs or marijuana and drinking alcohol. Do not take medicines that have not been prescribed for you. They may interfere with medicines you may be taking for depression, or they may make your depression worse. · Take your medicines exactly as they are prescribed. You may start to feel better within 1 to 3 weeks of taking antidepressant medicine. But it can take as many as 6 to 8 weeks to see more improvement. If you have questions or concerns about your medicines, or if you do not notice any improvement by 3 weeks, talk to your doctor. · Continue to take your medicine after your symptoms improve. Taking your medicine for at least 6 months after you feel better can help keep you from getting depressed again. If this isn't the first time you have been depressed, your doctor may recommend you to take medicine even longer. · If you have any side effects from your medicine, tell your doctor. Many side effects are mild and will go away on their own after you have been taking the medicine for a few weeks. Some may last longer. Talk to your doctor if side effects are bothering you too much. You might be able to try a different medicine. · Continue counseling. It may help prevent depression from returning, especially if you've had multiple episodes of depression. Talk with your counselor if you are having a hard time attending your sessions or you think the sessions aren't working.  Don't

## 2021-06-08 ENCOUNTER — TELEPHONE (OUTPATIENT)
Dept: INTERNAL MEDICINE CLINIC | Age: 31
End: 2021-06-08

## 2021-06-08 ENCOUNTER — HOSPITAL ENCOUNTER (OUTPATIENT)
Age: 31
Discharge: HOME | End: 2021-06-08
Payer: COMMERCIAL

## 2021-06-08 ENCOUNTER — VIRTUAL VISIT (OUTPATIENT)
Dept: INTERNAL MEDICINE CLINIC | Age: 31
End: 2021-06-08
Payer: COMMERCIAL

## 2021-06-08 DIAGNOSIS — R50.9 FEVER, UNSPECIFIED FEVER CAUSE: Primary | ICD-10-CM

## 2021-06-08 DIAGNOSIS — J02.9 ACUTE PHARYNGITIS, UNSPECIFIED ETIOLOGY: ICD-10-CM

## 2021-06-08 LAB — S PYO AG THROAT QL: POSITIVE

## 2021-06-08 PROCEDURE — 87880 STREP A ASSAY W/OPTIC: CPT

## 2021-06-08 PROCEDURE — 99213 OFFICE O/P EST LOW 20 MIN: CPT | Performed by: NURSE PRACTITIONER

## 2021-06-08 RX ORDER — PENICILLIN V POTASSIUM 500 MG/1
500 TABLET ORAL 4 TIMES DAILY
Qty: 40 TABLET | Refills: 0 | Status: SHIPPED | OUTPATIENT
Start: 2021-06-08 | End: 2021-06-18

## 2021-06-08 ASSESSMENT — ENCOUNTER SYMPTOMS
NAUSEA: 0
SHORTNESS OF BREATH: 0
VOMITING: 0
CONSTIPATION: 0
BLOOD IN STOOL: 0
DIARRHEA: 0
SORE THROAT: 1
EYE DISCHARGE: 0
ABDOMINAL PAIN: 0
COUGH: 0
WHEEZING: 0

## 2021-06-08 NOTE — TELEPHONE ENCOUNTER
----- Message from Barry Nichols sent at 6/8/2021 12:44 PM EDT -----  Subject: Message to Provider    QUESTIONS  Information for Provider? Patient is requesting to have a doctors note   written stating that he can return to work without having a fever within   24 hours. Patient is requesting to have it emailed to him if possible.   ---------------------------------------------------------------------------  --------------  [address]  What is the best way for the office to contact you? OK to leave message on   voicemail  Preferred Call Back Phone Number? [phone]  ---------------------------------------------------------------------------  --------------  SCRIPT ANSWERS  Relationship to Patient?  Self

## 2021-06-08 NOTE — LETTER
[facility] [place] [address]  Phone: [phone]  Fax: [phone]    Deacon Ferrell MD        June 8, 2021     Patient: Sandor Hollingsworth   YOB: 1990   Date of Visit: 6/8/2021       To Whom it May Concern:    Raman Clark was seen in my clinic on 6/8/2021. He may return to work on Friday June 18,2021 if he has been afebril for 24 hours without medication. .    If you have any questions or concerns, please don't hesitate to call.     Sincerely,         Deacon Ferrell MD

## 2021-06-08 NOTE — PROGRESS NOTES
Dorcas Koenig (:  1990) is a 31 y.o. male,Established patient, here for evaluation of the following chief complaint(s): Pharyngitis, Cough, and Fever         ASSESSMENT/PLAN:  1. Fever, unspecified fever cause  -     STREP SCREEN GROUP A THROAT  2. Acute pharyngitis, unspecified etiology  -     STREP SCREEN GROUP A THROAT    Hydrate well  Motrin for comfort  Salt water gargles  Isolate  Change toothbrush  Obtain throat swab. Results will guide treatment  No follow-ups on file. SUBJECTIVE/OBJECTIVE:  HPI  Pt reports sore throat that started yesterday. He took motrin and went to bed. Awakened in a pool of sweat, temp was 102 and throat was worse. He has not eaten today because throat is so sore. No other sx. Review of Systems   Constitutional: Positive for fever. HENT: Positive for sore throat. Negative for congestion. Eyes: Negative for discharge. Respiratory: Negative for cough, shortness of breath and wheezing. Cardiovascular: Negative for chest pain, palpitations and leg swelling. Gastrointestinal: Negative for abdominal pain, blood in stool, constipation, diarrhea, nausea and vomiting. Genitourinary: Negative for dysuria and hematuria. Musculoskeletal: Negative for myalgias. Skin: Negative for rash. Neurological: Negative for dizziness. Psychiatric/Behavioral: The patient is not nervous/anxious.         Patient-Reported Vitals 2021   Patient-Reported Weight -   Patient-Reported Temperature 102        Physical Exam    [INSTRUCTIONS:  \"[x]\" Indicates a positive item  \"[]\" Indicates a negative item  -- DELETE ALL ITEMS NOT EXAMINED]    Constitutional: [x] Appears well-developed and well-nourished [x] No apparent distress      [] Abnormal -     Mental status: [x] Alert and awake  [x] Oriented to person/place/time [x] Able to follow commands    [] Abnormal -     Eyes:   EOM    [x]  Normal    [] Abnormal -   Sclera  [x]  Normal    [] Abnormal -          Discharge [x]  None visible   [] Abnormal -     HENT: [x] Normocephalic, atraumatic  [] Abnormal -   [x] Mouth/Throat: Mucous membranes are moist  Voice sounds muffled. External Ears [x] Normal  [] Abnormal -    Neck: [x] No visualized mass [] Abnormal -     Pulmonary/Chest: [x] Respiratory effort normal   [x] No visualized signs of difficulty breathing or respiratory distress        [] Abnormal -      Musculoskeletal:   [x] Normal gait with no signs of ataxia         [x] Normal range of motion of neck        [] Abnormal -     Neurological:        [x] No Facial Asymmetry (Cranial nerve 7 motor function) (limited exam due to video visit)          [x] No gaze palsy        [] Abnormal -          Skin:        [x] No significant exanthematous lesions or discoloration noted on facial skin         [] Abnormal -            Psychiatric:       [x] Normal Affect [] Abnormal -        [x] No Hallucinations    Other pertinent observable physical exam findings:-        ADDENDUM;  Strep is positive  Pen Vee has been sent to [facility]          Sandor Hollingsworth, was evaluated through a synchronous (real-time) audio-video encounter. The patient (or guardian if applicable) is aware that this is a billable service. Verbal consent to proceed has been obtained within the past 12 months. The visit was conducted pursuant to the emergency declaration under the [address] [place] [address] authority and the [employer] and [employer] General Act. Patient identification was verified, and a caregiver was present when appropriate. The patient was located in a state where the provider was credentialed to provide care. An electronic signature was used to authenticate this note.     --KATRIN Marie - CNP

## 2021-08-18 ENCOUNTER — TELEMEDICINE (OUTPATIENT)
Dept: INTERNAL MEDICINE CLINIC | Age: 31
End: 2021-08-18
Payer: COMMERCIAL

## 2021-08-18 DIAGNOSIS — J06.9 UPPER RESPIRATORY TRACT INFECTION, UNSPECIFIED TYPE: Primary | ICD-10-CM

## 2021-08-18 PROCEDURE — 99212 OFFICE O/P EST SF 10 MIN: CPT | Performed by: NURSE PRACTITIONER

## 2021-08-18 ASSESSMENT — ENCOUNTER SYMPTOMS
TROUBLE SWALLOWING: 0
NAUSEA: 0
SHORTNESS OF BREATH: 0
BLOOD IN STOOL: 0
VOMITING: 0
DIARRHEA: 0
CONSTIPATION: 0
ABDOMINAL PAIN: 0
WHEEZING: 0
SINUS PRESSURE: 1
SORE THROAT: 0
EYE DISCHARGE: 0
COUGH: 1

## 2021-08-18 NOTE — PROGRESS NOTES
Denzel Omer (:  1990) is a 27 y.o. male,Established patient, here for evaluation of the following chief complaint(s): No chief complaint on file. ASSESSMENT/PLAN:  1. Upper respiratory tract infection, unspecified type  Robitussin cough syrup or mucinex DM  Flonase-1 spray each nares bid  Pepto Bisomol for stomach    Community testing sites for COVID test.  Call for additional needs. No follow-ups on file. SUBJECTIVE/OBJECTIVE:  HPI   Pt complains of head congestion over the past 4 days. He has taken no OTC med. His  Concern was because his aunt just tested positive and he has contact with her. He describes stomach feeling \"unsettled. \"  He did cough so hard he thought he might vomit, but didn't. He denies any fever, but admits to sweating. Review of Systems   Constitutional: Negative for fever. HENT: Positive for congestion, postnasal drip and sinus pressure. Negative for ear discharge, ear pain, sore throat and trouble swallowing. Eyes: Negative for discharge. Respiratory: Positive for cough. Negative for shortness of breath and wheezing. Cardiovascular: Negative for chest pain, palpitations and leg swelling. Gastrointestinal: Negative for abdominal pain, blood in stool, constipation, diarrhea, nausea and vomiting. Genitourinary: Negative for dysuria and hematuria. Musculoskeletal: Negative for myalgias. Skin: Negative for rash. Neurological: Negative for dizziness. Psychiatric/Behavioral: The patient is not nervous/anxious.         Patient-Reported Vitals 2021   Patient-Reported Weight -   Patient-Reported Temperature 102        Physical Exam    [INSTRUCTIONS:  \"[x]\" Indicates a positive item  \"[]\" Indicates a negative item  -- DELETE ALL ITEMS NOT EXAMINED]    Constitutional: [x] Appears well-developed and well-nourished [x] No apparent distress      [] Abnormal -     Mental status: [x] Alert and awake  [x] Oriented to person/place/time [x] Able to follow commands    [] Abnormal -     Eyes:   EOM    [x]  Normal    [] Abnormal -   Sclera  [x]  Normal    [] Abnormal -          Discharge [x]  None visible   [] Abnormal -     HENT: [x] Normocephalic, atraumatic  [] Abnormal -   [x] Mouth/Throat: Mucous membranes are moist  Pt sounds congested when speaking  External Ears [x] Normal  [] Abnormal -    Neck: [x] No visualized mass [] Abnormal -     Pulmonary/Chest: [x] Respiratory effort normal   [x] No visualized signs of difficulty breathing or respiratory distress        [] Abnormal -      Musculoskeletal:   [] Normal gait with no signs of ataxia         [x] Normal range of motion of neck        [] Abnormal -     Neurological:        [x] No Facial Asymmetry (Cranial nerve 7 motor function) (limited exam due to video visit)          [x] No gaze palsy        [] Abnormal -          Skin:        [x] No significant exanthematous lesions or discoloration noted on facial skin         [] Abnormal -            Psychiatric:       [x] Normal Affect [] Abnormal -        [x] No Hallucinations    Other pertinent observable physical exam findings:-                Tonja Whittington, was evaluated through a synchronous (real-time) audio-video encounter. The patient (or guardian if applicable) is aware that this is a billable service. Verbal consent to proceed has been obtained within the past 12 months. The visit was conducted pursuant to the emergency declaration under the [address] [place] [address] authority and the [email or web address] and [employer] General Act. Patient identification was verified, and a caregiver was present when appropriate. The patient was located in a state where the provider was credentialed to provide care. An electronic signature was used to authenticate this note.     --Miky Pinzon, KATRIN - CNP

## 2022-05-05 ENCOUNTER — TELEPHONE (OUTPATIENT)
Dept: INTERNAL MEDICINE CLINIC | Age: 32
End: 2022-05-05

## 2022-05-05 ENCOUNTER — NURSE TRIAGE (OUTPATIENT)
Dept: OTHER | Facility: CLINIC | Age: 32
End: 2022-05-05

## 2022-05-05 NOTE — TELEPHONE ENCOUNTER
Received call from [employer] at [facility] with Red Flag Complaint. Subjective: Caller states \"L knee swelling. I hurt my R ankle last year and overcorrecting/compensating it while walking caused the swelling in the L knee. \"     Current Symptoms: Reports L knee swelling, half bigger than R knee     Denies recent inj to L knee, discoloration to L knee, unilateral calf pain, painful to the touch, warm to touch, erythema,     Onset: 1 month ago; worsening    Associated Symptoms: NA    Pain Severity: 5/10; sharp, aching; intermittent    Temperature: denies    What has been tried:     LMP: NA Pregnant: NA    Recommended disposition: See PCP within 24 Hours    Care advice provided, patient verbalizes understanding; denies any other questions or concerns; instructed to call back for any new or worsening symptoms. Writer provided warm transfer to Maddy Mendez at [facility] for refusal of appt within 24 hours     Attention Provider: Thank you for allowing me to participate in the care of your patient. The patient was connected to triage in response to information provided to the ECC/PSC. Please do not respond through this encounter as the response is not directed to a shared pool.     Reason for Disposition   [1] Very swollen joint AND [2] no fever    Protocols used: KNEE SWELLING-ADULT-AH

## 2022-05-05 NOTE — TELEPHONE ENCOUNTER
Left knee pain for the past month and is now double the size of the right knee.      Patient is coming in on Tuesday

## 2022-05-10 ENCOUNTER — OFFICE VISIT (OUTPATIENT)
Dept: INTERNAL MEDICINE CLINIC | Age: 32
End: 2022-05-10
Payer: COMMERCIAL

## 2022-05-10 VITALS
DIASTOLIC BLOOD PRESSURE: 76 MMHG | OXYGEN SATURATION: 99 % | WEIGHT: 259 LBS | SYSTOLIC BLOOD PRESSURE: 132 MMHG | BODY MASS INDEX: 34.17 KG/M2 | HEART RATE: 88 BPM

## 2022-05-10 DIAGNOSIS — G89.29 CHRONIC PAIN OF LEFT KNEE: Primary | ICD-10-CM

## 2022-05-10 DIAGNOSIS — M25.462 EFFUSION OF LEFT KNEE: ICD-10-CM

## 2022-05-10 DIAGNOSIS — M25.562 CHRONIC PAIN OF LEFT KNEE: Primary | ICD-10-CM

## 2022-05-10 PROCEDURE — 99213 OFFICE O/P EST LOW 20 MIN: CPT | Performed by: FAMILY MEDICINE

## 2022-05-10 RX ORDER — CELECOXIB 200 MG/1
CAPSULE ORAL
Qty: 60 CAPSULE | Refills: 1 | Status: SHIPPED | OUTPATIENT
Start: 2022-05-10

## 2022-05-10 SDOH — ECONOMIC STABILITY: FOOD INSECURITY: WITHIN THE PAST 12 MONTHS, YOU WORRIED THAT YOUR FOOD WOULD RUN OUT BEFORE YOU GOT MONEY TO BUY MORE.: NEVER TRUE

## 2022-05-10 SDOH — ECONOMIC STABILITY: FOOD INSECURITY: WITHIN THE PAST 12 MONTHS, THE FOOD YOU BOUGHT JUST DIDN'T LAST AND YOU DIDN'T HAVE MONEY TO GET MORE.: NEVER TRUE

## 2022-05-10 ASSESSMENT — PATIENT HEALTH QUESTIONNAIRE - PHQ9
4. FEELING TIRED OR HAVING LITTLE ENERGY: 3
SUM OF ALL RESPONSES TO PHQ QUESTIONS 1-9: 13
SUM OF ALL RESPONSES TO PHQ QUESTIONS 1-9: 13
5. POOR APPETITE OR OVEREATING: 2
SUM OF ALL RESPONSES TO PHQ QUESTIONS 1-9: 13
3. TROUBLE FALLING OR STAYING ASLEEP: 0
8. MOVING OR SPEAKING SO SLOWLY THAT OTHER PEOPLE COULD HAVE NOTICED. OR THE OPPOSITE, BEING SO FIGETY OR RESTLESS THAT YOU HAVE BEEN MOVING AROUND A LOT MORE THAN USUAL: 1
2. FEELING DOWN, DEPRESSED OR HOPELESS: 2
10. IF YOU CHECKED OFF ANY PROBLEMS, HOW DIFFICULT HAVE THESE PROBLEMS MADE IT FOR YOU TO DO YOUR WORK, TAKE CARE OF THINGS AT HOME, OR GET ALONG WITH OTHER PEOPLE: 1
9. THOUGHTS THAT YOU WOULD BE BETTER OFF DEAD, OR OF HURTING YOURSELF: 0
7. TROUBLE CONCENTRATING ON THINGS, SUCH AS READING THE NEWSPAPER OR WATCHING TELEVISION: 3
6. FEELING BAD ABOUT YOURSELF - OR THAT YOU ARE A FAILURE OR HAVE LET YOURSELF OR YOUR FAMILY DOWN: 2
SUM OF ALL RESPONSES TO PHQ QUESTIONS 1-9: 13

## 2022-05-10 ASSESSMENT — ENCOUNTER SYMPTOMS
COLOR CHANGE: 0
NAUSEA: 0
CONSTIPATION: 0
WHEEZING: 0
VOMITING: 0
COUGH: 0
EYE PAIN: 0
SORE THROAT: 0
CHEST TIGHTNESS: 0
ABDOMINAL PAIN: 0
DIARRHEA: 0
BACK PAIN: 0
EYE DISCHARGE: 0
SINUS PRESSURE: 0
SHORTNESS OF BREATH: 0
RHINORRHEA: 0
TROUBLE SWALLOWING: 0
EYE REDNESS: 0

## 2022-05-10 ASSESSMENT — SOCIAL DETERMINANTS OF HEALTH (SDOH): HOW HARD IS IT FOR YOU TO PAY FOR THE VERY BASICS LIKE FOOD, HOUSING, MEDICAL CARE, AND HEATING?: NOT HARD AT ALL

## 2022-05-10 NOTE — PATIENT INSTRUCTIONS
Take Celebrex 1-2 times a day. Schedule appointment with [facility] (Dr. Ly Espitia) - [phone]   Return if symptoms worsen or fail to improve.

## 2022-05-10 NOTE — PROGRESS NOTES
Subjective:      Patient ID: Ashlyn Christianson is a 27 y.o.male who is being seen for   Chief Complaint   Patient presents with    Knee Pain     sx for past month / tx ice and motrin / unsure of injury        HPI  C/O left knee pain and swelling off and on for several years. Previously saw Dr. Heidy Iqbal for it. MRI had been ordered but was never done. Pain and swelling have been much worse in the past month. No known injury. 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 On feet for about 12 hours a day. Denies clicking or locking but states that he hears something \"like 2 rubber bands rubbing together. \"  No giving out. Pt did have positive depression screening. Patient's medications, past medical, surgical, social, and family historieswere reviewed by me personally and updated as appropriate. Outpatient Medications Marked as Taking for the 5/10/22 encounter (Office Visit) with Luz Marina Holden MD   Medication Sig Dispense Refill    celecoxib (CELEBREX) 200 MG capsule Take one capsule by mouth 1-2 times per day. 60 capsule 1       Review of Systems  Review of Systems   Constitutional: Negative for fatigue, fever and unexpected weight change. HENT: Negative for congestion, ear pain, hearing loss, postnasal drip, rhinorrhea, sinus pressure, sore throat and trouble swallowing. Eyes: Negative for pain, discharge, redness and visual disturbance. Respiratory: Negative for cough, chest tightness, shortness of breath and wheezing. Cardiovascular: Negative for chest pain, palpitations and leg swelling. Gastrointestinal: Negative for abdominal pain, constipation, diarrhea, nausea and vomiting. Endocrine: Negative for cold intolerance, heat intolerance, polydipsia, polyphagia and polyuria. Genitourinary: Negative for dysuria, flank pain, hematuria and testicular pain. Musculoskeletal: Positive for arthralgias (left knee) and joint swelling (left knee). Negative for back pain and myalgias. Skin: Negative for color change and rash. Allergic/Immunologic: Negative for environmental allergies, food allergies and immunocompromised state. Neurological: Negative for dizziness, seizures, syncope, numbness and headaches. Hematological: Negative for adenopathy. Psychiatric/Behavioral: Positive for dysphoric mood. Negative for agitation, confusion and sleep disturbance. The patient is not nervous/anxious. Objective:   Physical Exam    Wt Readings from Last 3 Encounters:   05/10/22 259 lb (117.5 kg)   05/06/21 250 lb (113.4 kg)   03/30/21 255 lb (115.7 kg)       BP Readings from Last 3 Encounters:   05/10/22 132/76   05/06/21 118/80   03/30/21 120/72       Vitals:    05/10/22 1324   BP: 132/76   Pulse: 88   SpO2: 99%       Physical Exam  Nursing note and vitals reviewed. Vitals:    05/10/22 1324   BP: 132/76   Site: Left Upper Arm   Position: Sitting   Cuff Size: Large Adult   Pulse: 88   SpO2: 99%   Weight: 259 lb (117.5 kg)     Wt Readings from Last 3 Encounters:   05/10/22 259 lb (117.5 kg)   05/06/21 250 lb (113.4 kg)   03/30/21 255 lb (115.7 kg)     BP Readings from Last 3 Encounters:   05/10/22 132/76   05/06/21 118/80   03/30/21 120/72     Body mass index is 34.17 kg/m². Constitutional: Patient appears well-developed and well-nourished. No distress. Head: Normocephalic and atraumatic. Ears: Normal bilateral external ears, ear canals, and tympanic membranes    Oropharyngeal exam: mucous membranes moist, pharynx normal without lesions. Neck: Normal range of motion. Neck supple. No thyroidmegaly. No Carotid bruits. Cardiovascular: Normal rate, regular rhythm, normal heart sounds and intact distal pulses. Pulmonary/Chest: Effort normal and breath sounds normal. No stridor. No respiratory distress. No wheezes and no rales. Abdominal: Soft. Bowel sounds are normal. No distension and no mass. No tenderness. No rebound and no guarding. Musculoskeletal: + tenderness about left knee in the joint line. Noticeable effusion. Psychiatric: Normal mood and affect. Behavior is normal.                 Assessment       Diagnosis Orders   1. Chronic pain of left knee  Jass Dumont MD, Orthopedic Surgery, [facility]    celecoxib (CELEBREX) 200 MG capsule   2. Effusion of left knee  Jass Dumont MD, Orthopedic Surgery, [facility]    celecoxib (CELEBREX) 200 MG capsule            No Pepper was seen today for knee pain. Diagnoses and all orders for this visit:    Chronic pain of left knee  Effusion of left knee  -     Alyssa Jackson MD, Orthopedic Surgery, [facility]  -     celecoxib (CELEBREX) 200 MG capsule; Take one capsule by mouth 1-2 times per day. Will need X-rays/ MRI reordered, but will defer to ortho. Patient Instructions   Take Celebrex 1-2 times a day. Schedule appointment with [facility] (Dr. Susana Batista) - [phone]   Return if symptoms worsen or fail to improve.

## 2022-05-11 ENCOUNTER — TELEPHONE (OUTPATIENT)
Dept: ADMINISTRATIVE | Age: 32
End: 2022-05-11

## 2022-05-11 NOTE — TELEPHONE ENCOUNTER
Called and Left message for patient to call back. Left message that his Celecoxib 200MG capsules has been APPROVED through May 11, 2023.
Patient returned call and was given the message below
Submitted PA for Celecoxib 200MG capsules, Key: [id number]. Medication has been APPROVED through 05/11/2023. Please notify patient. Thank you.
-follow up cultures blood and urine, Rocephin, follow up ID
Changed to dapotomycin on 7/20, patient can go to [facility] with 5 more days of daptomycin
Changed to dapotomycin on 7/20, patient can go to [facility] with 5 more days of daptomycin
Changed to dapotomycin on 7/20, patient can go to [facility] with 5 more days of daptomycin
changed to dapotomycin, pt. can go to [facility] with 5 more days of daptomycin
cont meds
-cultures blood and urine, Rocephin, follow up ID

## 2022-05-18 ENCOUNTER — OFFICE VISIT (OUTPATIENT)
Dept: ORTHOPEDIC SURGERY | Age: 32
End: 2022-05-18

## 2022-05-18 VITALS — BODY MASS INDEX: 30 KG/M2 | HEIGHT: 73 IN | WEIGHT: 226.4 LBS

## 2022-05-18 DIAGNOSIS — M25.561 ACUTE PAIN OF RIGHT KNEE: ICD-10-CM

## 2022-05-18 DIAGNOSIS — M25.462 KNEE EFFUSION, LEFT: Primary | ICD-10-CM

## 2022-05-18 PROCEDURE — 20610 DRAIN/INJ JOINT/BURSA W/O US: CPT | Performed by: ORTHOPAEDIC SURGERY

## 2022-05-18 RX ORDER — BUPIVACAINE HYDROCHLORIDE 2.5 MG/ML
4 INJECTION, SOLUTION INFILTRATION; PERINEURAL ONCE
Status: COMPLETED | OUTPATIENT
Start: 2022-05-18 | End: 2022-05-18

## 2022-05-18 RX ORDER — LIDOCAINE HYDROCHLORIDE 10 MG/ML
4 INJECTION, SOLUTION INFILTRATION; PERINEURAL ONCE
Status: COMPLETED | OUTPATIENT
Start: 2022-05-18 | End: 2022-05-18

## 2022-05-18 RX ORDER — TRIAMCINOLONE ACETONIDE 40 MG/ML
40 INJECTION, SUSPENSION INTRA-ARTICULAR; INTRAMUSCULAR ONCE
Status: COMPLETED | OUTPATIENT
Start: 2022-05-18 | End: 2022-05-18

## 2022-05-18 RX ADMIN — BUPIVACAINE HYDROCHLORIDE 10 MG: 2.5 INJECTION, SOLUTION INFILTRATION; PERINEURAL at 15:57

## 2022-05-18 RX ADMIN — LIDOCAINE HYDROCHLORIDE 4 ML: 10 INJECTION, SOLUTION INFILTRATION; PERINEURAL at 15:57

## 2022-05-18 RX ADMIN — TRIAMCINOLONE ACETONIDE 40 MG: 40 INJECTION, SUSPENSION INTRA-ARTICULAR; INTRAMUSCULAR at 15:58

## 2022-05-18 NOTE — PROGRESS NOTES
CHIEF COMPLAINT: Left knee swelling    History:   Octavio Montoya is a 27 y.o. male referred by Salima Quinteros MD for Sports Medicine consultation for evaluation and treatment of left knee pain / injury. The patient complains of left knee pain. This is evaluated as a personal injury. The symptoms began 4 years ago in 2018. He previously saw Dr. Rosina Escalera in his PAs in 2018. At that time he did have a knee aspiration. There was suspicion for infection, but all aspirations demonstrated no evidence of infection. He states at that time he does not remember having any blood work to evaluate for rheumatologic/inflammatory disease. He states the swelling just resolved after the second aspiration. He denies any swelling or pain in any other joints. He denies any family history of any rheumatologic disease or gout. He denies any history of gout for himself. Rate pain 7/10. There was not a history of injury. The pain is located globally around the swelling. The knee has not given out or felt unstable. The patient can bend and straighten the knee fully. There is swelling in the knee. There was not catching / locking of the knee. The patient has not had PT. The patient has not had an injection. The patient has taken NSAIDs. The patient has tried ice. The patient's occupation is [employer] [address]. Outside reports reviewed: Lolita Toscano and Cole Burns office notes.       Past Medical History:   Diagnosis Date    Anxiety 6/28/2018    Depression     GERD (gastroesophageal reflux disease) 3/30/2021       Past Surgical History:   Procedure Laterality Date    WISDOM TOOTH EXTRACTION         Family History   Problem Relation Age of Onset    Other Mother     Mult Sclerosis Mother        Social History     Socioeconomic History    Marital status:      Spouse name: None    Number of children: None    Years of education: None    Highest education level: None   Occupational History    Occupation: [employer]    Tobacco Use    Smoking status: Never Smoker    Smokeless tobacco: Never Used   Vaping Use    Vaping Use: Some days    Substances: Never (Uses flavored liquid)   Substance and Sexual Activity    Alcohol use: No    Drug use: No    Sexual activity: Yes     Partners: Female     Birth control/protection: Condom   Other Topics Concern    None   Social History Narrative    None     Social Determinants of Health     Financial Resource Strain: Low Risk     Difficulty of Paying Living Expenses: Not hard at all   Food Insecurity: No Food Insecurity    Worried About Running Out of Food in the Last Year: Never true    Ricardo of Food in the Last Year: Never true   Transportation Needs:     Lack of Transportation (Medical): Not on file    Lack of Transportation (Non-Medical): Not on file   Physical Activity:     Days of Exercise per Week: Not on file    Minutes of Exercise per Session: Not on file   Stress:     Feeling of Stress : Not on file   Social Connections:     Frequency of Communication with Friends and Family: Not on file    Frequency of Social Gatherings with Friends and Family: Not on file    Attends Yarsanism Services: Not on file    Active Member of [address] [place] [address] or Organizations: Not on file    Attends Club or Organization Meetings: Not on file    Marital Status: Not on file   Intimate Partner Violence:     Fear of Current or Ex-Partner: Not on file    Emotionally Abused: Not on file    Physically Abused: Not on file    Sexually Abused: Not on file   Housing Stability:     Unable to Pay for Housing in the Last Year: Not on file    Number of [place] in the Last Year: Not on file    Unstable Housing in the Last Year: Not on file       Current Outpatient Medications   Medication Sig Dispense Refill    celecoxib (CELEBREX) 200 MG capsule Take one capsule by mouth 1-2 times per day.  60 capsule 1    escitalopram (LEXAPRO) 20 MG tablet Take 1 tablet by mouth daily (Patient not taking: Reported on 5/10/2022) 30 tablet 5     No current facility-administered medications for this visit. No Known Allergies    Review of Systems:  I have reviewed the clinically relevant past medical history, medications, allergies, family history, social history, and 13 point Review of Systems from the patient's recent history form & documented any details relevant to today's presenting complaints in the history above. The patient's self-reported past medical history, medications, allergies, family history, social history, and Review of Systems form from 5/18/22 have been scanned into the chart under the \"Media\" tab. Physical Examination:     Vital signs:   Ht 6' 1\" (1.854 m)   Wt 226 lb 6.4 oz (102.7 kg)   BMI 29.87 kg/m²     General:  alert, appears stated age, cooperative and no distress   Gait:  Normal. The patient can bear weight on the injured extremity. Left Knee  Alignment:  neutral   ROM:  0 degrees extension to 130 degrees flexion   Bilateral knees   Crepitus:  no   Joint Tenderness:  minimal parapatellar   Effusion:   >60 cc   Patellar excursion:  2 of 4 quadrants    Patellar tilt test:  negative   Patellar facet tenderness:  negative medial   negative lateral   Patellar apprehension test:  negative   Lachman test:  negative   Right knee: negative   Anterior drawer test:  negative   Right knee: negative   Posterior drawer:   negative    Right knee: negative   Varus laxity at 30 degrees:  negative   Right knee: negative   Valgus laxity at 30 degrees:   negative   Right knee: negative   Alessia's test:  negative   Right knee: negative     There is not any cellulitis, lymphedema or cutaneous lesions noted in the lower extremities. Motor exam of the lower extremities show quadriceps, hamstrings, foot dorsiflexion and plantarflexion grossly intact. Sensation to both feet is grossly intact to light touch.   The bilateral lower extremities are warm and well-perfused with brisk capillary refill. Imaging:  Left Knee X-Ray: 3 view x-rays of the knee, including bilateral AP and sunrise and lateral of the affected side were obtained and reviewed. No fracture, dislocation, lytic lesion. Knee effusion     Left Knee MRI [facility] 6/13/2018:  1. No traumatic meniscus or ligament tear. 2.  Complex, focal, likely traumatic near full-thickness delamination fissure of the inferomedial patellar facet, 8 x 11 mm in size  3. Large joint effusion with reactive synovitis, out of proportion to internal derangement; consider joint aspiration with synovial crystal analysis to evaluate for systemic inflammatory or crystalline deposition arthropathy      Assessment:     Left knee recurrent effusion  GERD      Plan:     Natural history and expected course discussed. Questions answered. He has now had recurrent effusions. Prior work-up demonstrated no evidence of septic arthritis or significant structural abnormality. We discussed that it appears that he did not have prior work-up for any rheumatologic or inflammatory disease. The risks and benefits of an aspiration and injection were discussed with the patient. The patient had full opportunity to ask questions and all were answered. The patient then provided verbal informed consent. The skin was then prepped with betadine solution and alcohol. Under aseptic conditions, the  left 70cc clear synovial fluid was aspirated and then was injected with 4cc of 1% xylocaine, 4cc of 0.25% marcaine, and 1cc of Kenalog (40mg/ml). There were no immediate complications following the injection. The patient was advised of the possibility of injection site reaction and instructed to apply ice to the area and take NSAIDs if able. The synovial fluid was sent for cell count, crystal analysis, and culture    I have also provided orders for him to have blood work done to evaluate for rheumatologic/inflammatory disease. Continue ice as needed.     Continue NSAIDs as needed. We will call him with test results. If this demonstrates any evidence of gout or rheumatologic/inflammatory disease we will refer him either back to his PCP or rheumatology. Arabella Carreon. Samara Nguyen MD  Orthopaedic Surgery and Sports Medicine     Disclaimer: This note was generated with use of a verbal recognition program and an attempt was made to check for errors. It is possible that there are still dictated errors within this office note. If so, please bring any significant errors to my attention for an addendum. All efforts were made to ensure that this office note is accurate.

## 2022-05-19 LAB
APPEARANCE FLUID: NORMAL
CELL COUNT FLUID TYPE: NORMAL
CLOT EVALUATION: NORMAL
COLOR FLUID: YELLOW
CRYSTALS, FLUID: NORMAL
LYMPHOCYTES, BODY FLUID: 50 %
MACROPHAGE FLUID: 18 %
NEUTROPHIL, FLUID: 32 %
NUCLEATED CELLS FLUID: 4706 /CUMM
NUMBER OF CELLS COUNTED FLUID: 100
RBC FLUID: 2000 /CUMM
SOURCE BODY FLUID: NORMAL
VOLUME: 18 ML

## 2022-05-24 ENCOUNTER — HOSPITAL ENCOUNTER (OUTPATIENT)
Age: 32
Discharge: HOME | End: 2022-05-24
Payer: COMMERCIAL

## 2022-05-24 DIAGNOSIS — M25.462 KNEE EFFUSION, LEFT: ICD-10-CM

## 2022-05-24 LAB
BASOPHILS ABSOLUTE: 0 K/UL (ref 0–0.2)
BASOPHILS RELATIVE PERCENT: 0.7 %
C-REACTIVE PROTEIN: <3 MG/L (ref 0–5.1)
EOSINOPHILS ABSOLUTE: 0.1 K/UL (ref 0–0.6)
EOSINOPHILS RELATIVE PERCENT: 1.6 %
HCT VFR BLD CALC: 43.2 % (ref 40.5–52.5)
HEMOGLOBIN: 14.2 G/DL (ref 13.5–17.5)
LYMPHOCYTES ABSOLUTE: 1.3 K/UL (ref 1–5.1)
LYMPHOCYTES RELATIVE PERCENT: 21.5 %
MCH RBC QN AUTO: 27.6 PG (ref 26–34)
MCHC RBC AUTO-ENTMCNC: 33 G/DL (ref 31–36)
MCV RBC AUTO: 83.6 FL (ref 80–100)
MONOCYTES ABSOLUTE: 0.3 K/UL (ref 0–1.3)
MONOCYTES RELATIVE PERCENT: 5.8 %
NEUTROPHILS ABSOLUTE: 4.2 K/UL (ref 1.7–7.7)
NEUTROPHILS RELATIVE PERCENT: 70.4 %
PDW BLD-RTO: 13.8 % (ref 12.4–15.4)
PLATELET # BLD: 235 K/UL (ref 135–450)
PMV BLD AUTO: 10.5 FL (ref 5–10.5)
RBC # BLD: 5.16 M/UL (ref 4.2–5.9)
RHEUMATOID FACTOR: <10 IU/ML
SEDIMENTATION RATE, ERYTHROCYTE: 13 MM/HR (ref 0–15)
URIC ACID, SERUM: 5.9 MG/DL (ref 3.5–7.2)
WBC # BLD: 6 K/UL (ref 4–11)

## 2022-05-24 PROCEDURE — 86038 ANTINUCLEAR ANTIBODIES: CPT

## 2022-05-24 PROCEDURE — 85025 COMPLETE CBC W/AUTO DIFF WBC: CPT

## 2022-05-24 PROCEDURE — 36415 COLL VENOUS BLD VENIPUNCTURE: CPT

## 2022-05-24 PROCEDURE — 85652 RBC SED RATE AUTOMATED: CPT

## 2022-05-24 PROCEDURE — 84550 ASSAY OF BLOOD/URIC ACID: CPT

## 2022-05-24 PROCEDURE — 86431 RHEUMATOID FACTOR QUANT: CPT

## 2022-05-24 PROCEDURE — 86140 C-REACTIVE PROTEIN: CPT

## 2022-05-25 LAB — ANTI-NUCLEAR ANTIBODY (ANA): NEGATIVE

## 2022-06-06 LAB
BODY FLUID CULTURE, STERILE: NORMAL
GRAM STAIN RESULT: NORMAL

## 2022-10-12 ENCOUNTER — TELEPHONE (OUTPATIENT)
Dept: INTERNAL MEDICINE CLINIC | Age: 32
End: 2022-10-12

## 2022-10-12 NOTE — TELEPHONE ENCOUNTER
Please contact pt to see if he feels safe to wait until appt tomorrow. If he has any intention or plan to hurt himself he needs to go to the ER now.

## 2022-10-12 NOTE — TELEPHONE ENCOUNTER
[phone] (home)   Left message for pt to contact the office. [phone] Danny / spouse / on hipaa. I reached out and did speak with her. She said she did talk with [employer] today . He is currently at work until 10. He did express concern to spouse . They talked it over and agreed to schedule with provider. I encouraged her that if for any reason he should need more immediate care that they can call 911 or go to the closest ED. She understands and will check in on him.

## 2022-10-12 NOTE — TELEPHONE ENCOUNTER
----- Message from [address] sent at 10/12/2022  1:18 PM EDT -----  Subject: Message to Provider    QUESTIONS  Information for Provider? Refused NT for anxiety. Had thoughts of hurting   himself. I did get him in for an appointment 10/13/66076 @ 8:30  ---------------------------------------------------------------------------  --------------  Elise Caballero INFO  [phone]; OK to leave message on voicemail  ---------------------------------------------------------------------------  --------------  SCRIPT ANSWERS  Relationship to Patient?  Self

## 2022-10-13 ENCOUNTER — TELEPHONE (OUTPATIENT)
Dept: INTERNAL MEDICINE CLINIC | Age: 32
End: 2022-10-13

## 2022-10-13 ENCOUNTER — OFFICE VISIT (OUTPATIENT)
Dept: INTERNAL MEDICINE CLINIC | Age: 32
End: 2022-10-13
Payer: COMMERCIAL

## 2022-10-13 VITALS
TEMPERATURE: 98.4 F | OXYGEN SATURATION: 98 % | BODY MASS INDEX: 28.23 KG/M2 | WEIGHT: 214 LBS | DIASTOLIC BLOOD PRESSURE: 74 MMHG | SYSTOLIC BLOOD PRESSURE: 128 MMHG | HEART RATE: 87 BPM

## 2022-10-13 DIAGNOSIS — F33.1 MAJOR DEPRESSIVE DISORDER, RECURRENT EPISODE, MODERATE (HCC): Primary | ICD-10-CM

## 2022-10-13 DIAGNOSIS — Z13.31 POSITIVE DEPRESSION SCREENING: ICD-10-CM

## 2022-10-13 DIAGNOSIS — F41.1 GAD (GENERALIZED ANXIETY DISORDER): ICD-10-CM

## 2022-10-13 PROCEDURE — 99213 OFFICE O/P EST LOW 20 MIN: CPT | Performed by: FAMILY MEDICINE

## 2022-10-13 RX ORDER — ESCITALOPRAM OXALATE 20 MG/1
20 TABLET ORAL DAILY
Qty: 30 TABLET | Refills: 5 | Status: SHIPPED | OUTPATIENT
Start: 2022-10-13

## 2022-10-13 ASSESSMENT — ENCOUNTER SYMPTOMS
TROUBLE SWALLOWING: 0
EYE REDNESS: 0
EYE PAIN: 0
DIARRHEA: 0
CONSTIPATION: 0
CHEST TIGHTNESS: 0
SORE THROAT: 0
ABDOMINAL PAIN: 0
COLOR CHANGE: 0
BACK PAIN: 0
NAUSEA: 0
VOMITING: 0
WHEEZING: 0
EYE DISCHARGE: 0
RHINORRHEA: 0
SHORTNESS OF BREATH: 0
COUGH: 0
SINUS PRESSURE: 0

## 2022-10-13 ASSESSMENT — PATIENT HEALTH QUESTIONNAIRE - PHQ9
SUM OF ALL RESPONSES TO PHQ QUESTIONS 1-9: 15
3. TROUBLE FALLING OR STAYING ASLEEP: 0
8. MOVING OR SPEAKING SO SLOWLY THAT OTHER PEOPLE COULD HAVE NOTICED. OR THE OPPOSITE, BEING SO FIGETY OR RESTLESS THAT YOU HAVE BEEN MOVING AROUND A LOT MORE THAN USUAL: 0
SUM OF ALL RESPONSES TO PHQ9 QUESTIONS 1 & 2: 4
1. LITTLE INTEREST OR PLEASURE IN DOING THINGS: 2
9. THOUGHTS THAT YOU WOULD BE BETTER OFF DEAD, OR OF HURTING YOURSELF: 1
SUM OF ALL RESPONSES TO PHQ QUESTIONS 1-9: 16
SUM OF ALL RESPONSES TO PHQ QUESTIONS 1-9: 16
10. IF YOU CHECKED OFF ANY PROBLEMS, HOW DIFFICULT HAVE THESE PROBLEMS MADE IT FOR YOU TO DO YOUR WORK, TAKE CARE OF THINGS AT HOME, OR GET ALONG WITH OTHER PEOPLE: 3
7. TROUBLE CONCENTRATING ON THINGS, SUCH AS READING THE NEWSPAPER OR WATCHING TELEVISION: 2
6. FEELING BAD ABOUT YOURSELF - OR THAT YOU ARE A FAILURE OR HAVE LET YOURSELF OR YOUR FAMILY DOWN: 3
SUM OF ALL RESPONSES TO PHQ QUESTIONS 1-9: 16
4. FEELING TIRED OR HAVING LITTLE ENERGY: 3
5. POOR APPETITE OR OVEREATING: 3
2. FEELING DOWN, DEPRESSED OR HOPELESS: 2

## 2022-10-13 ASSESSMENT — COLUMBIA-SUICIDE SEVERITY RATING SCALE - C-SSRS
5. HAVE YOU STARTED TO WORK OUT OR WORKED OUT THE DETAILS OF HOW TO KILL YOURSELF? DO YOU INTEND TO CARRY OUT THIS PLAN?: NO
1. WITHIN THE PAST MONTH, HAVE YOU WISHED YOU WERE DEAD OR WISHED YOU COULD GO TO SLEEP AND NOT WAKE UP?: YES
2. HAVE YOU ACTUALLY HAD ANY THOUGHTS OF KILLING YOURSELF?: YES
3. HAVE YOU BEEN THINKING ABOUT HOW YOU MIGHT KILL YOURSELF?: NO
4. HAVE YOU HAD THESE THOUGHTS AND HAD SOME INTENTION OF ACTING ON THEM?: NO
6. HAVE YOU EVER DONE ANYTHING, STARTED TO DO ANYTHING, OR PREPARED TO DO ANYTHING TO END YOUR LIFE?: NO

## 2022-10-13 NOTE — TELEPHONE ENCOUNTER
Left message , can do work note for today's visit. Need to know how he wants to receive. I have printed the note and placed at  if pt wants to pick it up.                 ----- Message from Andreas Suarez sent at 10/13/2022  4:30 PM EDT -----  Subject: Message to Provider    QUESTIONS  Information for Provider? PT needs a dr note for work for his apt   10/12/2022. Please follow up with PT  ---------------------------------------------------------------------------  --------------  Tello PINA  [phone]; OK to leave message on voicemail  ---------------------------------------------------------------------------  --------------  SCRIPT ANSWERS  Relationship to Patient?  Self

## 2022-10-13 NOTE — PROGRESS NOTES
Subjective:      Patient ID: Regine Ni is a 34 y.o.male who is being seen for   Chief Complaint   Patient presents with    Anxiety    Depression       HPI    C/O several months of worsening anxiety. Yesterday \"felt like I was in a dream all day. \"    Called off work to be here today but otherwise has been making it to work. Has used up all sick days. If he still had some, he would have called off. States he has had some depression sx but he thinks the primary problem is anxiety. Endorses tearfulness, decreased motivation, decreased self-esteem, feelings of guilt, constant worry/nervousness. Appetite decreased and has been losing weight. Feels tired all the time regardless of how much sleep he gets. Has had some SI but no plan or intent. \"I'd never want to and leave my kid alone. \"    Has been \"craving social interaction\" but \"in a hole\" at work for the whole day- 12 hour shifts. Drives 40 minutes each way. Training for a new position where he will interact with more people. This will be a lot more responsibilty which is pasrt of the reason for increased anxiety. Patient's medications, past medical, surgical, social, and family historieswere reviewed by me personally and updated as appropriate. Outpatient Medications Marked as Taking for the 10/13/22 encounter (Office Visit) with Risa Ruth MD   Medication Sig Dispense Refill    escitalopram (LEXAPRO) 20 MG tablet Take 1 tablet by mouth daily 30 tablet 5    celecoxib (CELEBREX) 200 MG capsule Take one capsule by mouth 1-2 times per day. (Patient taking differently: as needed Take one capsule by mouth 1-2 times per day.) 60 capsule 1       Review of Systems  Review of Systems   Constitutional:  Negative for fatigue, fever and unexpected weight change. HENT:  Negative for congestion, ear pain, hearing loss, postnasal drip, rhinorrhea, sinus pressure, sore throat and trouble swallowing.     Eyes:  Negative for pain, discharge, redness and visual disturbance. Respiratory:  Negative for cough, chest tightness, shortness of breath and wheezing. Cardiovascular:  Negative for chest pain, palpitations and leg swelling. Occasionally feels heart racing with anxiety   Gastrointestinal:  Negative for abdominal pain, constipation, diarrhea, nausea and vomiting. Endocrine: Negative for cold intolerance, heat intolerance, polydipsia, polyphagia and polyuria. Genitourinary:  Negative for dysuria, flank pain, hematuria and testicular pain. Musculoskeletal:  Negative for arthralgias, back pain, joint swelling and myalgias. Skin:  Negative for color change and rash. Allergic/Immunologic: Negative for environmental allergies, food allergies and immunocompromised state. Neurological:  Negative for dizziness, seizures, syncope, numbness and headaches. Hematological:  Negative for adenopathy. Psychiatric/Behavioral:  Positive for dysphoric mood and suicidal ideas (no intent or plan). Negative for agitation, confusion, hallucinations, self-injury and sleep disturbance. The patient is nervous/anxious. Objective:   Physical Exam    Wt Readings from Last 3 Encounters:   10/13/22 214 lb (97.1 kg)   05/18/22 226 lb 6.4 oz (102.7 kg)   05/10/22 259 lb (117.5 kg)       BP Readings from Last 3 Encounters:   10/13/22 128/74   05/10/22 132/76   05/06/21 118/80       Vitals:    10/13/22 0829   BP: 128/74   Pulse: 87   Temp: 98.4 °F (36.9 °C)   SpO2: 98%       Physical Exam  Nursing note and vitals reviewed.     Vitals:    10/13/22 0829   BP: 128/74   Site: Right Upper Arm   Position: Sitting   Cuff Size: Large Adult   Pulse: 87   Temp: 98.4 °F (36.9 °C)   SpO2: 98%   Weight: 214 lb (97.1 kg)     Wt Readings from Last 3 Encounters:   10/13/22 214 lb (97.1 kg)   05/18/22 226 lb 6.4 oz (102.7 kg)   05/10/22 259 lb (117.5 kg)     BP Readings from Last 3 Encounters:   10/13/22 128/74   05/10/22 132/76   05/06/21 118/80     Body mass index is 28.23 kg/m². Constitutional: Patient appears well-developed and well-nourished. No distress. Head: Normocephalic and atraumatic. Ears: Normal bilateral external ears, ear canals, and tympanic membranes    Oropharyngeal exam: mucous membranes moist, pharynx normal without lesions. Neck: Normal range of motion. Neck supple. No thyroidmegaly. No Carotid bruits. Cardiovascular: Normal rate, regular rhythm, normal heart sounds and intact distal pulses. Pulmonary/Chest: Effort normal and breath sounds normal. No stridor. No respiratory distress. No wheezes and no rales. Abdominal: Soft. Bowel sounds are normal. No distension and no mass. No tenderness. No rebound and no guarding. Musculoskeletal: No edema and no tenderness. Skin: No rash or erythema. Psychiatric: Normal mood and affect. Behavior is normal.       Assessment       Diagnosis Orders   1. Major depressive disorder, recurrent episode, moderate (HCC)  escitalopram (LEXAPRO) 20 MG tablet      2. RAHUL (generalized anxiety disorder)  escitalopram (LEXAPRO) 20 MG tablet      3. Positive depression screening                 Plan      Stone Fisher was seen today for anxiety and depression. Diagnoses and all orders for this visit:    Major depressive disorder, recurrent episode, moderate (HCC)  -     escitalopram (LEXAPRO) 20 MG tablet; Take 1 tablet by mouth daily  Start Lexapro (20) 0.5 pill qd x6-8 days, then increase to a whole pill qd if tolerating well. Referred to Britt[facility] Vallejo for counseling. Patient education materials given in AVS re: depression self-care. RAHUL (generalized anxiety disorder)  -     escitalopram (LEXAPRO) 20 MG tablet; Take 1 tablet by mouth daily  Start Lexapro (20) 0.5 pill qd x6-8 days, then increase to a whole pill qd if tolerating well. Referred to Brittani Vallejo for counseling. Patient education materials given in AVS re: anxiety/ relaxation techniques.     Positive depression screening  PHQ-9 score today: (PHQ-9 Total Score: 16), additional evaluation and assessment performed, follow-up plan includes but not limited to: Medication management and Referral to /Specialist  for evaluation and management. Return in about 6 weeks (around 11/24/2022) for recheck moods.

## 2022-10-13 NOTE — LETTER
[facility] [place] [address] [facility]  Phone: [phone]  Fax: [phone]    Pieter An MD        October 13, 2022     Patient: Brad Other   YOB: 1990   Date of Visit: 10/13/2022       To Whom it May Concern:    Torie Penn was seen in my clinic on 10/13/2022. He may return to work on 10-. If you have any questions or concerns, please don't hesitate to call.     Sincerely,         Pieter An MD

## 2022-10-13 NOTE — PATIENT INSTRUCTIONS
Start Lexapro (20mg)- half a pill per day for 6-8 days, then increase to a whole pill per day. Schedule appointment with Karl Garcia- therapist in this office.

## 2022-11-15 ENCOUNTER — OFFICE VISIT (OUTPATIENT)
Dept: INTERNAL MEDICINE CLINIC | Age: 32
End: 2022-11-15
Payer: COMMERCIAL

## 2022-11-15 VITALS
WEIGHT: 214 LBS | SYSTOLIC BLOOD PRESSURE: 124 MMHG | TEMPERATURE: 98.1 F | HEART RATE: 82 BPM | DIASTOLIC BLOOD PRESSURE: 78 MMHG | BODY MASS INDEX: 28.23 KG/M2 | OXYGEN SATURATION: 99 %

## 2022-11-15 DIAGNOSIS — A08.4 VIRAL GASTROENTERITIS: Primary | ICD-10-CM

## 2022-11-15 PROCEDURE — 99213 OFFICE O/P EST LOW 20 MIN: CPT | Performed by: FAMILY MEDICINE

## 2022-11-15 ASSESSMENT — ENCOUNTER SYMPTOMS
COUGH: 0
EYE PAIN: 0
SINUS PRESSURE: 0
EYE REDNESS: 0
CHEST TIGHTNESS: 0
SHORTNESS OF BREATH: 0
RHINORRHEA: 0
BLOOD IN STOOL: 0
SORE THROAT: 0
CONSTIPATION: 0
NAUSEA: 1
BACK PAIN: 0
TROUBLE SWALLOWING: 0
ABDOMINAL PAIN: 1
COLOR CHANGE: 0
WHEEZING: 0
DIARRHEA: 1
VOMITING: 1
EYE DISCHARGE: 0

## 2022-11-15 NOTE — PROGRESS NOTES
Subjective:      Patient ID: Trae Amaya is a 34 y.o.male who is being seen for   Chief Complaint   Patient presents with    Abdominal Pain     Diarrhea / nausea / vomiting   Sx for 2 days. Missed work 11-14 & 11-15       HPI  C/O 2-3 day h/o lower abdominal pain bilaterally, cramping, diarrhea, N/V. Subjective fever yesterday    No blood in stool or emesis. Has been able to tolerate po - holding down Gatorade, soup. Had a sandwich from [employer] yesterday that also stayed down. Feeling better today- diarrhea has improved but still feels nauseated. Has missed yesterday and today at work, so needs a doctor's note. Thinks UOP might be decreased yesterday and today. Patient's medications, past medical, surgical, social, and family historieswere reviewed by me personally and updated as appropriate. Outpatient Medications Marked as Taking for the 11/15/22 encounter (Office Visit) with Shahriar Page MD   Medication Sig Dispense Refill    escitalopram (LEXAPRO) 20 MG tablet Take 1 tablet by mouth daily 30 tablet 5    celecoxib (CELEBREX) 200 MG capsule Take one capsule by mouth 1-2 times per day. (Patient taking differently: as needed Take one capsule by mouth 1-2 times per day.) 60 capsule 1       Review of Systems  Review of Systems   Constitutional:  Positive for fatigue. Negative for fever and unexpected weight change. HENT:  Negative for congestion, ear pain, hearing loss, postnasal drip, rhinorrhea, sinus pressure, sore throat and trouble swallowing. Eyes:  Negative for pain, discharge, redness and visual disturbance. Respiratory:  Negative for cough, chest tightness, shortness of breath and wheezing. Cardiovascular:  Negative for chest pain, palpitations and leg swelling. Gastrointestinal:  Positive for abdominal pain, diarrhea, nausea and vomiting. Negative for blood in stool and constipation.    Endocrine: Negative for cold intolerance, heat intolerance, polydipsia, polyphagia and polyuria. Genitourinary:  Negative for dysuria, flank pain, hematuria and testicular pain. Musculoskeletal:  Negative for arthralgias, back pain, joint swelling and myalgias. Skin:  Negative for color change and rash. Allergic/Immunologic: Negative for environmental allergies, food allergies and immunocompromised state. Neurological:  Negative for dizziness, seizures, syncope, numbness and headaches. Hematological:  Negative for adenopathy. Psychiatric/Behavioral:  Negative for agitation, confusion, dysphoric mood and sleep disturbance. The patient is not nervous/anxious. Objective:   Physical Exam    Wt Readings from Last 3 Encounters:   11/15/22 214 lb (97.1 kg)   10/13/22 214 lb (97.1 kg)   05/18/22 226 lb 6.4 oz (102.7 kg)       BP Readings from Last 3 Encounters:   11/15/22 124/78   10/13/22 128/74   05/10/22 132/76       Vitals:    11/15/22 1143   BP: 124/78   Pulse: 82   Temp: 98.1 °F (36.7 °C)   SpO2: 99%       Physical Exam  Nursing note and vitals reviewed. Vitals:    11/15/22 1143   BP: 124/78   Site: Right Upper Arm   Position: Sitting   Cuff Size: Large Adult   Pulse: 82   Temp: 98.1 °F (36.7 °C)   TempSrc: Temporal   SpO2: 99%   Weight: 214 lb (97.1 kg)     Wt Readings from Last 3 Encounters:   11/15/22 214 lb (97.1 kg)   10/13/22 214 lb (97.1 kg)   05/18/22 226 lb 6.4 oz (102.7 kg)     BP Readings from Last 3 Encounters:   11/15/22 124/78   10/13/22 128/74   05/10/22 132/76     Body mass index is 28.23 kg/m². Constitutional: Patient appears well-developed and well-nourished. No distress. Head: Normocephalic and atraumatic. Oropharyngeal exam: mucous membranes moist, pharynx normal without lesions. Neck: Normal range of motion. Neck supple. No thyroidmegaly. No Carotid bruits. No LAD. Cardiovascular: Normal rate, regular rhythm, normal heart sounds and intact distal pulses. Pulmonary/Chest: Effort normal and breath sounds normal. No stridor.  No respiratory distress. No wheezes and no rales. Abdominal: Soft. Bowel sounds are normal. No distension and no mass. Mild diffuse tenderness. No rebound and no guarding. Musculoskeletal: No edema and no tenderness. Skin: No rash or erythema. Psychiatric: Normal mood and affect. Behavior is normal.       Assessment       Diagnosis Orders   1. Viral gastroenteritis                 Plan      Janis France was seen today for abdominal pain. Diagnoses and all orders for this visit:    Viral gastroenteritis  Improving. Tolerating po. Note given for work. Patient Instructions   Increase fluid intake. Monitor urine output to make sure you are urinating normally. [place] diet for now and advance as tolerated. Return if symptoms worsen or fail to improve. Patient education materials given in AVS re: AGE.

## 2022-11-15 NOTE — PATIENT INSTRUCTIONS
Increase fluid intake. Monitor urine output to make sure you are urinating normally. [place] diet for now and advance as tolerated. Return if symptoms worsen or fail to improve.

## 2022-11-15 NOTE — LETTER
[facility] [place] [address] [facility]  Phone: [phone]  Fax: [phone]    Carlos Lewis MD        November 15, 2022     Patient: Aakash Holt   YOB: 1990   Date of Visit: 11/15/2022       To Whom It May Concern:    Isidra Maher was seen in my clinic on 11/15/22. He should remain off work until [employer] for 24 hours and symptoms have improved. Estimated return to work: 11/18/22. If you have any questions or concerns, please don't hesitate to call.     Sincerely,        Carlos Lewis MD

## 2022-11-16 ENCOUNTER — TELEPHONE (OUTPATIENT)
Dept: INTERNAL MEDICINE CLINIC | Age: 32
End: 2022-11-16

## 2022-11-16 NOTE — TELEPHONE ENCOUNTER
Left voice mail for patient to call office to let us know why he missed his 11/16/22 appointment with BRISEIDA Caicedo and to reschedule

## 2022-11-17 ENCOUNTER — TELEPHONE (OUTPATIENT)
Dept: INTERNAL MEDICINE CLINIC | Age: 32
End: 2022-11-17

## 2022-11-17 NOTE — TELEPHONE ENCOUNTER
Left voice mail for patient to call office to let us know why he missed his 11/17/22 appointment with Dr. Donna Dye and to call and reschedule

## 2022-11-22 ENCOUNTER — OFFICE VISIT (OUTPATIENT)
Dept: INTERNAL MEDICINE CLINIC | Age: 32
End: 2022-11-22
Payer: COMMERCIAL

## 2022-11-22 VITALS
OXYGEN SATURATION: 97 % | HEART RATE: 54 BPM | DIASTOLIC BLOOD PRESSURE: 78 MMHG | TEMPERATURE: 97.8 F | WEIGHT: 211 LBS | SYSTOLIC BLOOD PRESSURE: 126 MMHG | BODY MASS INDEX: 27.84 KG/M2

## 2022-11-22 DIAGNOSIS — Z23 NEED FOR INFLUENZA VACCINATION: ICD-10-CM

## 2022-11-22 DIAGNOSIS — F33.1 MAJOR DEPRESSIVE DISORDER, RECURRENT EPISODE, MODERATE (HCC): Primary | ICD-10-CM

## 2022-11-22 DIAGNOSIS — K59.00 CONSTIPATION, UNSPECIFIED CONSTIPATION TYPE: ICD-10-CM

## 2022-11-22 PROCEDURE — 99214 OFFICE O/P EST MOD 30 MIN: CPT | Performed by: FAMILY MEDICINE

## 2022-11-22 PROCEDURE — 90674 CCIIV4 VAC NO PRSV 0.5 ML IM: CPT | Performed by: FAMILY MEDICINE

## 2022-11-22 PROCEDURE — 90471 IMMUNIZATION ADMIN: CPT | Performed by: FAMILY MEDICINE

## 2022-11-22 ASSESSMENT — ENCOUNTER SYMPTOMS
SORE THROAT: 0
TROUBLE SWALLOWING: 0
COLOR CHANGE: 0
EYE REDNESS: 0
COUGH: 0
DIARRHEA: 0
RHINORRHEA: 0
CONSTIPATION: 1
EYE PAIN: 0
EYE DISCHARGE: 0
SINUS PRESSURE: 0
VOMITING: 0
NAUSEA: 0
BACK PAIN: 0
SHORTNESS OF BREATH: 0
WHEEZING: 0
CHEST TIGHTNESS: 0
ABDOMINAL PAIN: 1

## 2022-11-22 NOTE — PROGRESS NOTES
Subjective:      Patient ID: Unique Licea is a 34 y.o.male who is being seen for   Chief Complaint   Patient presents with    Medication Check    GI Problem     Still no regular bowel movements         HPI  Mood Disorder:  Patient presents for follow-up of depression and anxiety disorder. Current complaints include: intermittent depressed mood, but much improved since starting Lexapro. He denies tearfulness, feelings of hopelessness, feelings of worthlessness/excessive guilt, insomnia, restlessness, panic attacks, obsessive thoughts, compulsive behaviors, increased use of drugs or alcohol, and suicidal thoughts or behavior. Symptoms/signs of shlomo: none. External stressors: nothing new. Current treatment includes: Lexapro- 20 qd. Medication side effects: occasional nausea  without vomiting. Pt c/o stomach \"still upset. \" Seen 11/15/22 for viral gastroenteritis. States that diarrhea has resolved but he is now having one \"pebbley\" stool per day. Still having some \"discomfort\" in abdomen. No fevers. No blood in stool. Patient's medications, past medical, surgical, social, and family historieswere reviewed by me personally and updated as appropriate. Outpatient Medications Marked as Taking for the 11/22/22 encounter (Office Visit) with Tina Ernst MD   Medication Sig Dispense Refill    escitalopram (LEXAPRO) 20 MG tablet Take 1 tablet by mouth daily 30 tablet 5    celecoxib (CELEBREX) 200 MG capsule Take one capsule by mouth 1-2 times per day. (Patient taking differently: as needed Take one capsule by mouth 1-2 times per day.) 60 capsule 1       Review of Systems  Review of Systems   Constitutional:  Negative for fatigue, fever and unexpected weight change. HENT:  Negative for congestion, ear pain, hearing loss, postnasal drip, rhinorrhea, sinus pressure, sore throat and trouble swallowing. Eyes:  Negative for pain, discharge, redness and visual disturbance.    Respiratory: Negative for cough, chest tightness, shortness of breath and wheezing. Cardiovascular:  Negative for chest pain, palpitations and leg swelling. Gastrointestinal:  Positive for abdominal pain (\"discomfort\"- \"feels full\") and constipation (\"pebbley\" stools). Negative for diarrhea, nausea and vomiting. Endocrine: Negative for cold intolerance, heat intolerance, polydipsia, polyphagia and polyuria. Genitourinary:  Negative for dysuria, flank pain, hematuria and testicular pain. Musculoskeletal:  Negative for arthralgias, back pain, joint swelling and myalgias. Skin:  Negative for color change and rash. Allergic/Immunologic: Negative for environmental allergies, food allergies and immunocompromised state. Neurological:  Negative for dizziness, seizures, syncope, numbness and headaches. Hematological:  Negative for adenopathy. Psychiatric/Behavioral:  Positive for dysphoric mood. Negative for agitation, confusion and sleep disturbance. The patient is not nervous/anxious. Objective:   Physical Exam    Wt Readings from Last 3 Encounters:   11/22/22 211 lb (95.7 kg)   11/15/22 214 lb (97.1 kg)   10/13/22 214 lb (97.1 kg)       BP Readings from Last 3 Encounters:   11/22/22 126/78   11/15/22 124/78   10/13/22 128/74       Vitals:    11/22/22 0758   BP: 126/78   Pulse: 54   Temp: 97.8 °F (36.6 °C)   SpO2: 97%       Physical Exam  Nursing note and vitals reviewed. Vitals:    11/22/22 0758   BP: 126/78   Site: Left Upper Arm   Position: Sitting   Cuff Size: Large Adult   Pulse: 54   Temp: 97.8 °F (36.6 °C)   TempSrc: Temporal   SpO2: 97%   Weight: 211 lb (95.7 kg)     Wt Readings from Last 3 Encounters:   11/22/22 211 lb (95.7 kg)   11/15/22 214 lb (97.1 kg)   10/13/22 214 lb (97.1 kg)     BP Readings from Last 3 Encounters:   11/22/22 126/78   11/15/22 124/78   10/13/22 128/74     Body mass index is 27.84 kg/m². Constitutional: Patient appears well-developed and well-nourished. No distress.    Head: Normocephalic and atraumatic. Ears: Normal bilateral external ears, ear canals, and tympanic membranes    Oropharyngeal exam: mucous membranes moist, pharynx normal without lesions. Neck: Normal range of motion. Neck supple. No thyroidmegaly. No Carotid bruits. Cardiovascular: Normal rate, regular rhythm, normal heart sounds and intact distal pulses. Pulmonary/Chest: Effort normal and breath sounds normal. No stridor. No respiratory distress. No wheezes and no rales. Abdominal: Soft. Bowel sounds are normal. No distension and no mass. Mild diffuse tenderness. No rebound and no guarding. Musculoskeletal: No edema and no tenderness. Skin: No rash or erythema. Psychiatric: Slightly depressed mood and affect. Behavior is normal.       Assessment       Diagnosis Orders   1. Major depressive disorder, recurrent episode, moderate ([address]        2. Constipation, unspecified constipation type        3. Need for influenza vaccination  Influenza, FLUCELVAX, (age 10 mo+), IM, Preservative Free, 0.5 mL               No      Emperatriz was seen today for medication check and gi problem. Diagnoses and all orders for this visit:    Major depressive disorder, recurrent episode, moderate (HCC)  Improved. Pt satisfied with current sx relief. Continue current medicines. Scheduled to see Loanmoiz Radha 12/14/22 for initial visit. Patient education materials given in AVS re: self-care. Constipation, unspecified constipation type  Recommended Miralax- half to a full dose daily and titrate for one soft stool per day. Increase fluid intake. Patient education materials given in AVS.  Call if sx fail to improve. Need for influenza vaccination  -     Influenza, FLUCELVAX, (age 10 mo+), IM, Preservative Free, 0.5 mL  Flu shot given today. Pt declined pneumonia vaccine. States trying to quit smoking. Return in about 3 months (around 2/22/2023) for recheck moods.     Time spent on encounter: 30 minutes

## 2023-04-26 NOTE — TELEPHONE ENCOUNTER
Reason for Disposition   Patient wants to be seen   Fever with no signs of serious infection or localizing symptoms    Answer Assessment - Initial Assessment Questions  1. TEMPERATURE: \"What is the most recent temperature? \"  \"How was it measured? \"         99.8 orally    2. ONSET: \"When did the fever start? \"         Last night 11/04/2020    3. SYMPTOMS: \"Do you have any other symptoms besides the fever? \"  (e.g., colds, headache, sore throat, earache, cough, rash, diarrhea, vomiting, abdominal pain)        Sore throat , cough, weakness    4. CAUSE: If there are no symptoms, ask: \"What do you think is causing the fever? \"         Possible strep throat , or allergies    5. CONTACTS: \"Does anyone else in the family have an infection? \"        No    6. TREATMENT: \"What have you done so far to treat this fever? \" (e.g., medications)        theraflu night time    7. IMMUNOCOMPROMISE: \"Do you have of the following: diabetes, HIV positive, splenectomy, cancer chemotherapy, chronic steroid treatment, transplant patient, etc.\"        No    8. PREGNANCY: \"Is there any chance you are pregnant? \" \"When was your last menstrual period? \"        NA    9. TRAVEL: \"Have you traveled out of the country in the last month? \" (e.g., travel history, exposures)        No    Protocols used: FEVER-ADULT-[place]    Patient called pre-service center [facility] with red flag complaint. Brief description of triage: low grade fever sore throat slight cough     Triage indicates for patient to be seen today    Care advice provided, patient verbalizes understanding; denies any other questions or concerns; instructed to call back for any new or worsening symptoms. Writer provided warm transfer to Vaishnavi Martines  at [facility] for appointment scheduling. Attention Provider: Thank you for allowing me to participate in the care of your patient. The patient was connected to triage in response to information provided to the [facility].  Please do not respond through this encounter as the response is not directed to a shared pool. Muscle Hinge Flap Text: The defect edges were debeveled with a #15 scalpel blade.  Given the size, depth and location of the defect and the proximity to free margins a muscle hinge flap was deemed most appropriate.  Using a sterile surgical marker, an appropriate hinge flap was drawn incorporating the defect. The area thus outlined was incised with a #15 scalpel blade.  The skin margins were undermined to an appropriate distance in all directions utilizing iris scissors.

## 2023-06-06 ENCOUNTER — HOSPITAL ENCOUNTER (EMERGENCY)
Age: 33
Discharge: HOME | End: 2023-06-06
Attending: EMERGENCY MEDICINE

## 2023-06-06 ENCOUNTER — APPOINTMENT (OUTPATIENT)
Dept: CT IMAGING | Age: 33
End: 2023-06-06

## 2023-06-06 VITALS
DIASTOLIC BLOOD PRESSURE: 63 MMHG | OXYGEN SATURATION: 94 % | TEMPERATURE: 98.1 F | SYSTOLIC BLOOD PRESSURE: 94 MMHG | BODY MASS INDEX: 27.14 KG/M2 | HEART RATE: 55 BPM | RESPIRATION RATE: 15 BRPM | HEIGHT: 74 IN | WEIGHT: 211.5 LBS

## 2023-06-06 DIAGNOSIS — N20.1 URETERIC STONE: Primary | ICD-10-CM

## 2023-06-06 LAB
ALBUMIN SERPL-MCNC: 4.8 G/DL (ref 3.4–5)
ALBUMIN/GLOB SERPL: 1.5 {RATIO} (ref 1.1–2.2)
ALP SERPL-CCNC: 65 U/L (ref 40–129)
ALT SERPL-CCNC: 46 U/L (ref 10–40)
ANION GAP SERPL CALCULATED.3IONS-SCNC: 15 MMOL/L (ref 3–16)
AST SERPL-CCNC: 39 U/L (ref 15–37)
BASOPHILS # BLD: 0.1 K/UL (ref 0–0.2)
BASOPHILS NFR BLD: 1 %
BILIRUB SERPL-MCNC: 0.4 MG/DL (ref 0–1)
BILIRUB UR QL STRIP.AUTO: ABNORMAL
BUN SERPL-MCNC: 11 MG/DL (ref 7–20)
CALCIUM SERPL-MCNC: 9.3 MG/DL (ref 8.3–10.6)
CHLORIDE SERPL-SCNC: 102 MMOL/L (ref 99–110)
CLARITY UR: CLEAR
CO2 SERPL-SCNC: 21 MMOL/L (ref 21–32)
COLOR UR: YELLOW
CREAT SERPL-MCNC: 1 MG/DL (ref 0.9–1.3)
DEPRECATED RDW RBC AUTO: 13.4 % (ref 12.4–15.4)
EOSINOPHIL # BLD: 0 K/UL (ref 0–0.6)
EOSINOPHIL NFR BLD: 0.3 %
EPI CELLS #/AREA URNS HPF: ABNORMAL /HPF (ref 0–5)
GFR SERPLBLD CREATININE-BSD FMLA CKD-EPI: >60 ML/MIN/{1.73_M2}
GLUCOSE SERPL-MCNC: 106 MG/DL (ref 70–99)
GLUCOSE UR STRIP.AUTO-MCNC: NEGATIVE MG/DL
HCT VFR BLD AUTO: 45.3 % (ref 40.5–52.5)
HGB BLD-MCNC: 15 G/DL (ref 13.5–17.5)
HGB UR QL STRIP.AUTO: ABNORMAL
KETONES UR STRIP.AUTO-MCNC: 15 MG/DL
LEUKOCYTE ESTERASE UR QL STRIP.AUTO: NEGATIVE
LYMPHOCYTES # BLD: 1.1 K/UL (ref 1–5.1)
LYMPHOCYTES NFR BLD: 9.3 %
MCH RBC QN AUTO: 28.3 PG (ref 26–34)
MCHC RBC AUTO-ENTMCNC: 33.2 G/DL (ref 31–36)
MCV RBC AUTO: 85.4 FL (ref 80–100)
MONOCYTES # BLD: 0.5 K/UL (ref 0–1.3)
MONOCYTES NFR BLD: 4.2 %
MUCOUS THREADS #/AREA URNS LPF: ABNORMAL /LPF
NEUTROPHILS # BLD: 10.3 K/UL (ref 1.7–7.7)
NEUTROPHILS NFR BLD: 85.2 %
NITRITE UR QL STRIP.AUTO: NEGATIVE
PH UR STRIP.AUTO: 5.5 [PH] (ref 5–8)
PLATELET # BLD AUTO: 251 K/UL (ref 135–450)
PMV BLD AUTO: 10.6 FL (ref 5–10.5)
POTASSIUM SERPL-SCNC: 4.6 MMOL/L (ref 3.5–5.1)
PROT SERPL-MCNC: 8 G/DL (ref 6.4–8.2)
PROT UR STRIP.AUTO-MCNC: NEGATIVE MG/DL
RBC # BLD AUTO: 5.31 M/UL (ref 4.2–5.9)
RBC #/AREA URNS HPF: ABNORMAL /HPF (ref 0–4)
SODIUM SERPL-SCNC: 138 MMOL/L (ref 136–145)
SP GR UR STRIP.AUTO: >=1.03 (ref 1–1.03)
UA COMPLETE W REFLEX CULTURE PNL UR: ABNORMAL
UA DIPSTICK W REFLEX MICRO PNL UR: YES
URN SPEC COLLECT METH UR: ABNORMAL
UROBILINOGEN UR STRIP-ACNC: 0.2 E.U./DL
WBC # BLD AUTO: 12.1 K/UL (ref 4–11)
WBC #/AREA URNS HPF: ABNORMAL /HPF (ref 0–5)

## 2023-06-06 PROCEDURE — 81001 URINALYSIS AUTO W/SCOPE: CPT

## 2023-06-06 PROCEDURE — 85025 COMPLETE CBC W/AUTO DIFF WBC: CPT

## 2023-06-06 PROCEDURE — 96374 THER/PROPH/DIAG INJ IV PUSH: CPT

## 2023-06-06 PROCEDURE — 99285 EMERGENCY DEPT VISIT HI MDM: CPT

## 2023-06-06 PROCEDURE — 6360000004 HC RX CONTRAST MEDICATION: Performed by: EMERGENCY MEDICINE

## 2023-06-06 PROCEDURE — 80053 COMPREHEN METABOLIC PANEL: CPT

## 2023-06-06 PROCEDURE — 6370000000 HC RX 637 (ALT 250 FOR IP): Performed by: EMERGENCY MEDICINE

## 2023-06-06 PROCEDURE — 6360000002 HC RX W HCPCS: Performed by: EMERGENCY MEDICINE

## 2023-06-06 PROCEDURE — 2580000003 HC RX 258: Performed by: EMERGENCY MEDICINE

## 2023-06-06 PROCEDURE — 74177 CT ABD & PELVIS W/CONTRAST: CPT

## 2023-06-06 PROCEDURE — 96375 TX/PRO/DX INJ NEW DRUG ADDON: CPT

## 2023-06-06 RX ORDER — TAMSULOSIN HYDROCHLORIDE 0.4 MG/1
0.4 CAPSULE ORAL DAILY
Qty: 90 CAPSULE | Refills: 1 | Status: SHIPPED | OUTPATIENT
Start: 2023-06-06

## 2023-06-06 RX ORDER — ONDANSETRON 2 MG/ML
4 INJECTION INTRAMUSCULAR; INTRAVENOUS ONCE
Status: COMPLETED | OUTPATIENT
Start: 2023-06-06 | End: 2023-06-06

## 2023-06-06 RX ORDER — MORPHINE SULFATE 4 MG/ML
4 INJECTION, SOLUTION INTRAMUSCULAR; INTRAVENOUS ONCE
Status: COMPLETED | OUTPATIENT
Start: 2023-06-06 | End: 2023-06-06

## 2023-06-06 RX ORDER — TAMSULOSIN HYDROCHLORIDE 0.4 MG/1
0.4 CAPSULE ORAL ONCE
Status: COMPLETED | OUTPATIENT
Start: 2023-06-06 | End: 2023-06-06

## 2023-06-06 RX ORDER — 0.9 % SODIUM CHLORIDE 0.9 %
1000 INTRAVENOUS SOLUTION INTRAVENOUS ONCE
Status: COMPLETED | OUTPATIENT
Start: 2023-06-06 | End: 2023-06-06

## 2023-06-06 RX ORDER — OXYCODONE HYDROCHLORIDE 5 MG/1
5 TABLET ORAL EVERY 6 HOURS PRN
Qty: 12 TABLET | Refills: 0 | Status: SHIPPED | OUTPATIENT
Start: 2023-06-06 | End: 2023-06-09

## 2023-06-06 RX ORDER — KETOROLAC TROMETHAMINE 10 MG/1
10 TABLET, FILM COATED ORAL EVERY 6 HOURS PRN
Qty: 20 TABLET | Refills: 0 | Status: SHIPPED | OUTPATIENT
Start: 2023-06-06 | End: 2024-06-05

## 2023-06-06 RX ORDER — KETOROLAC TROMETHAMINE 30 MG/ML
30 INJECTION, SOLUTION INTRAMUSCULAR; INTRAVENOUS ONCE
Status: COMPLETED | OUTPATIENT
Start: 2023-06-06 | End: 2023-06-06

## 2023-06-06 RX ADMIN — SODIUM CHLORIDE 1000 ML: 9 INJECTION, SOLUTION INTRAVENOUS at 21:05

## 2023-06-06 RX ADMIN — MORPHINE SULFATE 4 MG: 4 INJECTION, SOLUTION INTRAMUSCULAR; INTRAVENOUS at 21:06

## 2023-06-06 RX ADMIN — TAMSULOSIN HYDROCHLORIDE 0.4 MG: 0.4 CAPSULE ORAL at 23:42

## 2023-06-06 RX ADMIN — IOPAMIDOL 75 ML: 755 INJECTION, SOLUTION INTRAVENOUS at 22:04

## 2023-06-06 RX ADMIN — ONDANSETRON 4 MG: 2 INJECTION INTRAMUSCULAR; INTRAVENOUS at 21:06

## 2023-06-06 RX ADMIN — KETOROLAC TROMETHAMINE 30 MG: 30 INJECTION, SOLUTION INTRAMUSCULAR; INTRAVENOUS at 21:06

## 2023-06-06 ASSESSMENT — PAIN DESCRIPTION - FREQUENCY: FREQUENCY: CONTINUOUS

## 2023-06-06 ASSESSMENT — PAIN DESCRIPTION - DESCRIPTORS: DESCRIPTORS: ACHING;STABBING

## 2023-06-06 ASSESSMENT — ENCOUNTER SYMPTOMS
VOMITING: 0
COUGH: 0
NAUSEA: 1
SHORTNESS OF BREATH: 0
BACK PAIN: 0

## 2023-06-06 ASSESSMENT — PAIN SCALES - GENERAL
PAINLEVEL_OUTOF10: 10
PAINLEVEL_OUTOF10: 3

## 2023-06-06 ASSESSMENT — PAIN - FUNCTIONAL ASSESSMENT: PAIN_FUNCTIONAL_ASSESSMENT: 0-10

## 2023-06-06 ASSESSMENT — PAIN DESCRIPTION - LOCATION
LOCATION: ABDOMEN
LOCATION: ABDOMEN;GROIN
LOCATION: FLANK

## 2023-06-06 ASSESSMENT — PAIN DESCRIPTION - PAIN TYPE: TYPE: ACUTE PAIN

## 2023-06-06 ASSESSMENT — PAIN DESCRIPTION - ORIENTATION
ORIENTATION: RIGHT
ORIENTATION: RIGHT

## 2023-06-07 NOTE — ED PROVIDER NOTES
Take lowest dose possible to manage pain Max Daily Amount: 20 mg    TAMSULOSIN (FLOMAX) 0.4 MG CAPSULE    Take 1 capsule by mouth daily       DISCONTINUED MEDICATIONS:  Discontinued Medications    CELECOXIB (CELEBREX) 200 MG CAPSULE    Take one capsule by mouth 1-2 times per day. DISPOSITION REFERRAL (if applicable):  [facility]  ED  [address] [facility]  [address]  [phone]    If symptoms worsen, As needed    Delio Walker, APRN - CNP  [address] [place] [address]    Schedule an appointment as soon as possible for a visit       [facility] [address] [facility]  [phone]    Schedule an appointment as soon as possible for a visit         _____________________________________      Yolette Mckeon DO, ([facility]) am the primary attending of record and contributed the majority of evaluation and treatment of emergent care for this encounter. This chart was generated in part by using Dragon Dictation system and may contain errors related to that system including errors in grammar, punctuation, and spelling, as well as words and phrases that may be inappropriate. If there are any questions or concerns please feel free to contact the dictating provider for clarification.      LILIANA MCKEON DO (electronically signed)   [address] [facility] [address], DO  06/06/23 [id number]

## 2023-09-22 ENCOUNTER — HOSPITAL ENCOUNTER (EMERGENCY)
Age: 33
Discharge: HOME | End: 2023-09-22
Payer: COMMERCIAL

## 2023-09-22 ENCOUNTER — APPOINTMENT (OUTPATIENT)
Dept: CT IMAGING | Age: 33
End: 2023-09-22
Payer: COMMERCIAL

## 2023-09-22 VITALS
HEIGHT: 74 IN | SYSTOLIC BLOOD PRESSURE: 113 MMHG | DIASTOLIC BLOOD PRESSURE: 80 MMHG | RESPIRATION RATE: 15 BRPM | OXYGEN SATURATION: 96 % | BODY MASS INDEX: 27.41 KG/M2 | TEMPERATURE: 98.3 F | HEART RATE: 68 BPM | WEIGHT: 213.6 LBS

## 2023-09-22 DIAGNOSIS — R53.83 MALAISE AND FATIGUE: ICD-10-CM

## 2023-09-22 DIAGNOSIS — R53.81 MALAISE AND FATIGUE: ICD-10-CM

## 2023-09-22 DIAGNOSIS — R10.32 ABDOMINAL PAIN, LEFT LOWER QUADRANT: ICD-10-CM

## 2023-09-22 DIAGNOSIS — K57.32 DIVERTICULITIS OF COLON: Primary | ICD-10-CM

## 2023-09-22 LAB
ALBUMIN SERPL-MCNC: 4.4 G/DL (ref 3.4–5)
ALBUMIN/GLOB SERPL: 1.4 {RATIO} (ref 1.1–2.2)
ALP SERPL-CCNC: 55 U/L (ref 40–129)
ALT SERPL-CCNC: 20 U/L (ref 10–40)
ANION GAP SERPL CALCULATED.3IONS-SCNC: 10 MMOL/L (ref 3–16)
AST SERPL-CCNC: 17 U/L (ref 15–37)
BASOPHILS # BLD: 0 K/UL (ref 0–0.2)
BASOPHILS NFR BLD: 0.5 %
BILIRUB SERPL-MCNC: 0.5 MG/DL (ref 0–1)
BILIRUB UR QL STRIP.AUTO: NEGATIVE
BUN SERPL-MCNC: 11 MG/DL (ref 7–20)
CALCIUM SERPL-MCNC: 9.1 MG/DL (ref 8.3–10.6)
CHLORIDE SERPL-SCNC: 99 MMOL/L (ref 99–110)
CLARITY UR: CLEAR
CO2 SERPL-SCNC: 25 MMOL/L (ref 21–32)
COLOR UR: YELLOW
CREAT SERPL-MCNC: 0.7 MG/DL (ref 0.9–1.3)
DEPRECATED RDW RBC AUTO: 13 % (ref 12.4–15.4)
EOSINOPHIL # BLD: 0.1 K/UL (ref 0–0.6)
EOSINOPHIL NFR BLD: 0.6 %
GFR SERPLBLD CREATININE-BSD FMLA CKD-EPI: >60 ML/MIN/{1.73_M2}
GLUCOSE SERPL-MCNC: 98 MG/DL (ref 70–99)
GLUCOSE UR STRIP.AUTO-MCNC: NEGATIVE MG/DL
HCT VFR BLD AUTO: 40.2 % (ref 40.5–52.5)
HGB BLD-MCNC: 13.5 G/DL (ref 13.5–17.5)
HGB UR QL STRIP.AUTO: NEGATIVE
KETONES UR STRIP.AUTO-MCNC: NEGATIVE MG/DL
LEUKOCYTE ESTERASE UR QL STRIP.AUTO: NEGATIVE
LYMPHOCYTES # BLD: 1.1 K/UL (ref 1–5.1)
LYMPHOCYTES NFR BLD: 13.1 %
MCH RBC QN AUTO: 28.9 PG (ref 26–34)
MCHC RBC AUTO-ENTMCNC: 33.6 G/DL (ref 31–36)
MCV RBC AUTO: 85.8 FL (ref 80–100)
MONOCYTES # BLD: 0.7 K/UL (ref 0–1.3)
MONOCYTES NFR BLD: 8.6 %
NEUTROPHILS # BLD: 6.3 K/UL (ref 1.7–7.7)
NEUTROPHILS NFR BLD: 77.2 %
NITRITE UR QL STRIP.AUTO: NEGATIVE
PH UR STRIP.AUTO: 6 [PH] (ref 5–8)
PLATELET # BLD AUTO: 161 K/UL (ref 135–450)
PMV BLD AUTO: 10.1 FL (ref 5–10.5)
POTASSIUM SERPL-SCNC: 4.3 MMOL/L (ref 3.5–5.1)
PROT SERPL-MCNC: 7.5 G/DL (ref 6.4–8.2)
PROT UR STRIP.AUTO-MCNC: NEGATIVE MG/DL
RBC # BLD AUTO: 4.69 M/UL (ref 4.2–5.9)
SODIUM SERPL-SCNC: 134 MMOL/L (ref 136–145)
SP GR UR STRIP.AUTO: 1.02 (ref 1–1.03)
UA COMPLETE W REFLEX CULTURE PNL UR: NORMAL
UA DIPSTICK W REFLEX MICRO PNL UR: NORMAL
URN SPEC COLLECT METH UR: NORMAL
UROBILINOGEN UR STRIP-ACNC: 1 E.U./DL
WBC # BLD AUTO: 8.2 K/UL (ref 4–11)

## 2023-09-22 PROCEDURE — 74176 CT ABD & PELVIS W/O CONTRAST: CPT

## 2023-09-22 PROCEDURE — 81003 URINALYSIS AUTO W/O SCOPE: CPT

## 2023-09-22 PROCEDURE — 6360000002 HC RX W HCPCS: Performed by: PHYSICIAN ASSISTANT

## 2023-09-22 PROCEDURE — 96374 THER/PROPH/DIAG INJ IV PUSH: CPT

## 2023-09-22 PROCEDURE — 85025 COMPLETE CBC W/AUTO DIFF WBC: CPT

## 2023-09-22 PROCEDURE — 80053 COMPREHEN METABOLIC PANEL: CPT

## 2023-09-22 PROCEDURE — 6370000000 HC RX 637 (ALT 250 FOR IP): Performed by: PHYSICIAN ASSISTANT

## 2023-09-22 PROCEDURE — 99284 EMERGENCY DEPT VISIT MOD MDM: CPT

## 2023-09-22 RX ORDER — KETOROLAC TROMETHAMINE 30 MG/ML
15 INJECTION, SOLUTION INTRAMUSCULAR; INTRAVENOUS ONCE
Status: COMPLETED | OUTPATIENT
Start: 2023-09-22 | End: 2023-09-22

## 2023-09-22 RX ORDER — AMOXICILLIN AND CLAVULANATE POTASSIUM 875; 125 MG/1; MG/1
1 TABLET, FILM COATED ORAL ONCE
Status: COMPLETED | OUTPATIENT
Start: 2023-09-22 | End: 2023-09-22

## 2023-09-22 RX ORDER — AMOXICILLIN AND CLAVULANATE POTASSIUM 875; 125 MG/1; MG/1
1 TABLET, FILM COATED ORAL 2 TIMES DAILY
Qty: 20 TABLET | Refills: 0 | Status: SHIPPED | OUTPATIENT
Start: 2023-09-22 | End: 2023-10-02

## 2023-09-22 RX ADMIN — KETOROLAC TROMETHAMINE 15 MG: 30 INJECTION, SOLUTION INTRAMUSCULAR; INTRAVENOUS at 09:35

## 2023-09-22 RX ADMIN — AMOXICILLIN AND CLAVULANATE POTASSIUM 1 TABLET: 875; 125 TABLET, FILM COATED ORAL at 12:12

## 2023-09-22 ASSESSMENT — PAIN DESCRIPTION - PAIN TYPE: TYPE: ACUTE PAIN

## 2023-09-22 ASSESSMENT — PAIN SCALES - GENERAL
PAINLEVEL_OUTOF10: 5
PAINLEVEL_OUTOF10: 2
PAINLEVEL_OUTOF10: 5

## 2023-09-22 ASSESSMENT — PAIN - FUNCTIONAL ASSESSMENT: PAIN_FUNCTIONAL_ASSESSMENT: 0-10

## 2023-09-22 ASSESSMENT — PAIN DESCRIPTION - LOCATION
LOCATION: ABDOMEN
LOCATION: ABDOMEN

## 2023-09-22 NOTE — ED PROVIDER NOTES
[address] [place] [address]  ED  EMERGENCY DEPARTMENT ENCOUNTER        Pt Name: William Sutton  MRN: [id number]  [address] [facility] [place] 1990  Date of evaluation: 9/22/2023  Provider: Zo Sanchez PA-C  PCP: Shirin Patel  ED Attending: Samara Reyes MD      NILA. I have evaluated this patient. CHIEF COMPLAINT:     Chief Complaint   Patient presents with    Abdominal Pain     Pt complains of abdominal back. Says he has a hx of kidney stones and has the same pain now. Has had a fever the past couple days. HISTORY OF PRESENT ILLNESS:      History provided by the patient. No limitations. William Sutton is a 28 y.o. male who arrives to the ED by private vehicle. This patient complains of left flank pain that started on Wednesday, 9/20. He reports he was exhausted upon getting home from work and just slept. He slept a lot of the day yesterday as well. He woke up today and continues to have intermittent left flank pain that he currently rates 5/10. He denies any other associated symptoms. No fevers, chills. No dysuria or hematuria. He denies any abnormal bowel movements. He cannot identify exacerbating or alleviating factors to his symptoms. He does have a history of kidney stones in the past and upon experiencing the symptoms, started taking Flomax wondering if it could be a kidney stone and Flomax might help. He missed work yesterday and did not go in today and therefore is here for evaluation. Nursing Notes were reviewed     REVIEW OF SYSTEMS:     Review of Systems  Positives and pertinent negatives as per HPI.       PAST MEDICAL HISTORY:     Past Medical History:   Diagnosis Date    Anxiety 6/28/2018    Depression     GERD (gastroesophageal reflux disease) 3/30/2021       SURGICAL HISTORY:      Past Surgical History:   Procedure Laterality Date    WISDOM TOOTH EXTRACTION         CURRENT MEDICATIONS:       Previous Medications    ESCITALOPRAM (LEXAPRO) 20 MG TABLET    Take 1 tablet by mouth

## 2023-09-29 ENCOUNTER — OFFICE VISIT (OUTPATIENT)
Dept: INTERNAL MEDICINE CLINIC | Age: 33
End: 2023-09-29

## 2023-09-29 VITALS
WEIGHT: 214 LBS | HEART RATE: 82 BPM | BODY MASS INDEX: 27.46 KG/M2 | OXYGEN SATURATION: 97 % | DIASTOLIC BLOOD PRESSURE: 68 MMHG | HEIGHT: 74 IN | SYSTOLIC BLOOD PRESSURE: 108 MMHG | TEMPERATURE: 97.4 F

## 2023-09-29 DIAGNOSIS — F32.5 MAJOR DEPRESSIVE DISORDER WITH SINGLE EPISODE, IN FULL REMISSION (HCC): Primary | ICD-10-CM

## 2023-09-29 DIAGNOSIS — Z23 NEED FOR INFLUENZA VACCINATION: ICD-10-CM

## 2023-09-29 DIAGNOSIS — Z00.00 PREVENTATIVE HEALTH CARE: ICD-10-CM

## 2023-09-29 DIAGNOSIS — F41.1 GAD (GENERALIZED ANXIETY DISORDER): ICD-10-CM

## 2023-09-29 DIAGNOSIS — K57.92 DIVERTICULITIS: ICD-10-CM

## 2023-09-29 DIAGNOSIS — E55.9 VITAMIN D DEFICIENCY: ICD-10-CM

## 2023-09-29 SDOH — ECONOMIC STABILITY: INCOME INSECURITY: HOW HARD IS IT FOR YOU TO PAY FOR THE VERY BASICS LIKE FOOD, HOUSING, MEDICAL CARE, AND HEATING?: NOT HARD AT ALL

## 2023-09-29 SDOH — ECONOMIC STABILITY: HOUSING INSECURITY
IN THE LAST 12 MONTHS, WAS THERE A TIME WHEN YOU DID NOT HAVE A STEADY PLACE TO SLEEP OR SLEPT IN A SHELTER (INCLUDING NOW)?: NO

## 2023-09-29 SDOH — ECONOMIC STABILITY: FOOD INSECURITY: WITHIN THE PAST 12 MONTHS, YOU WORRIED THAT YOUR FOOD WOULD RUN OUT BEFORE YOU GOT MONEY TO BUY MORE.: NEVER TRUE

## 2023-09-29 SDOH — ECONOMIC STABILITY: FOOD INSECURITY: WITHIN THE PAST 12 MONTHS, THE FOOD YOU BOUGHT JUST DIDN'T LAST AND YOU DIDN'T HAVE MONEY TO GET MORE.: NEVER TRUE

## 2023-09-29 ASSESSMENT — PATIENT HEALTH QUESTIONNAIRE - PHQ9
5. POOR APPETITE OR OVEREATING: 0
2. FEELING DOWN, DEPRESSED OR HOPELESS: 1
SUM OF ALL RESPONSES TO PHQ9 QUESTIONS 1 & 2: 2
6. FEELING BAD ABOUT YOURSELF - OR THAT YOU ARE A FAILURE OR HAVE LET YOURSELF OR YOUR FAMILY DOWN: 2
10. IF YOU CHECKED OFF ANY PROBLEMS, HOW DIFFICULT HAVE THESE PROBLEMS MADE IT FOR YOU TO DO YOUR WORK, TAKE CARE OF THINGS AT HOME, OR GET ALONG WITH OTHER PEOPLE: 1
1. LITTLE INTEREST OR PLEASURE IN DOING THINGS: 1
SUM OF ALL RESPONSES TO PHQ QUESTIONS 1-9: 7
4. FEELING TIRED OR HAVING LITTLE ENERGY: 2
SUM OF ALL RESPONSES TO PHQ QUESTIONS 1-9: 8
9. THOUGHTS THAT YOU WOULD BE BETTER OFF DEAD, OR OF HURTING YOURSELF: 1
8. MOVING OR SPEAKING SO SLOWLY THAT OTHER PEOPLE COULD HAVE NOTICED. OR THE OPPOSITE, BEING SO FIGETY OR RESTLESS THAT YOU HAVE BEEN MOVING AROUND A LOT MORE THAN USUAL: 0
3. TROUBLE FALLING OR STAYING ASLEEP: 0
7. TROUBLE CONCENTRATING ON THINGS, SUCH AS READING THE NEWSPAPER OR WATCHING TELEVISION: 1

## 2023-09-29 ASSESSMENT — COLUMBIA-SUICIDE SEVERITY RATING SCALE - C-SSRS
6. HAVE YOU EVER DONE ANYTHING, STARTED TO DO ANYTHING, OR PREPARED TO DO ANYTHING TO END YOUR LIFE?: NO
1. WITHIN THE PAST MONTH, HAVE YOU WISHED YOU WERE DEAD OR WISHED YOU COULD GO TO SLEEP AND NOT WAKE UP?: NO
4. HAVE YOU HAD THESE THOUGHTS AND HAD SOME INTENTION OF ACTING ON THEM?: YES
2. HAVE YOU ACTUALLY HAD ANY THOUGHTS OF KILLING YOURSELF?: YES
3. HAVE YOU BEEN THINKING ABOUT HOW YOU MIGHT KILL YOURSELF?: NO
5. HAVE YOU STARTED TO WORK OUT OR WORKED OUT THE DETAILS OF HOW TO KILL YOURSELF? DO YOU INTEND TO CARRY OUT THIS PLAN?: NO

## 2023-09-29 NOTE — PROGRESS NOTES
no rebounding or guarding. There is no evidence of hernia. SKIN:  There are no rashes, lesions, or ulcers noted. Warm and dry with good turgor. MUSCULOSKELETAL:  Johanna Goo is coordinated and smooth. There is no clubbing, cyanosis or edema. B/I pedal pulses are palpable. NEUROLOGIC:  Cranial nerves II through XII are grossly intact. Sensation to light touch and pain is intact and bilaterally. Marcie Garzon MD    This dictation was generated by voice recognition computer software. Although all attempts are made to edit the dictation for accuracy, there may be errors in the transcription that are not intended.

## 2024-11-27 ENCOUNTER — OFFICE VISIT (OUTPATIENT)
Dept: ORTHOPEDIC SURGERY | Age: 34
End: 2024-11-27
Payer: COMMERCIAL

## 2024-11-27 VITALS — HEIGHT: 74 IN | BODY MASS INDEX: 29.26 KG/M2 | WEIGHT: 228 LBS

## 2024-11-27 DIAGNOSIS — S93.491A SPRAIN OF ANTERIOR TALOFIBULAR LIGAMENT OF RIGHT ANKLE, INITIAL ENCOUNTER: ICD-10-CM

## 2024-11-27 DIAGNOSIS — M25.571 RIGHT ANKLE PAIN, UNSPECIFIED CHRONICITY: Primary | ICD-10-CM

## 2024-11-27 PROCEDURE — 99213 OFFICE O/P EST LOW 20 MIN: CPT | Performed by: ORTHOPAEDIC SURGERY

## 2024-11-27 NOTE — PROGRESS NOTES
CHIEF COMPLAINT: Right ankle pain/ Ankle sprain.    DATE OF INJURY: 11/23/24    History:  Mr. Aceves is a 34 y.o. male presents today for the first visit for evaluation of a right ankle injury which occurred when he was fell off a step and twisted his ankle.    He is complaining of  ankle pain. He rates pain 5/10.   Pain increases with walking.  Pain improves with ice.   He has noticed swelling and bruising.  No numbness or tingling sensation.  He does have a history of prior right ankle sprain many years ago.  He states that it was pretty severe.      Past Medical History:   Diagnosis Date    Anxiety 6/28/2018    Depression     GERD (gastroesophageal reflux disease) 3/30/2021       Past Surgical History:   Procedure Laterality Date    WISDOM TOOTH EXTRACTION         Current Outpatient Medications on File Prior to Visit   Medication Sig Dispense Refill    ketorolac (TORADOL) 10 MG tablet Take 1 tablet by mouth every 6 hours as needed for Pain 20 tablet 0    tamsulosin (FLOMAX) 0.4 MG capsule Take 1 capsule by mouth daily 90 capsule 1     No current facility-administered medications on file prior to visit.       No Known Allergies    Social History     Socioeconomic History    Marital status:      Spouse name: Not on file    Number of children: Not on file    Years of education: Not on file    Highest education level: Not on file   Occupational History    Occupation: patio room    Tobacco Use    Smoking status: Every Day     Types: Cigarettes    Smokeless tobacco: Never   Vaping Use    Vaping status: Every Day    Start date: 1/1/2022    Substances: Nicotine (Uses flavored liquid), Flavoring    Devices: Disposable    Passive vaping exposure: Yes   Substance and Sexual Activity    Alcohol use: No    Drug use: No    Sexual activity: Yes     Partners: Female     Birth control/protection: Condom   Other Topics Concern    Not on file   Social History Narrative    Not on file     Social Determinants of

## 2025-07-21 ENCOUNTER — OFFICE VISIT (OUTPATIENT)
Dept: INTERNAL MEDICINE CLINIC | Age: 35
End: 2025-07-21
Payer: COMMERCIAL

## 2025-07-21 VITALS
HEIGHT: 74 IN | WEIGHT: 235 LBS | BODY MASS INDEX: 30.16 KG/M2 | SYSTOLIC BLOOD PRESSURE: 130 MMHG | OXYGEN SATURATION: 98 % | TEMPERATURE: 98 F | HEART RATE: 79 BPM | DIASTOLIC BLOOD PRESSURE: 80 MMHG

## 2025-07-21 DIAGNOSIS — L91.8 SKIN TAG: Primary | ICD-10-CM

## 2025-07-21 DIAGNOSIS — R21 SKIN RASH: ICD-10-CM

## 2025-07-21 PROBLEM — F33.1 MAJOR DEPRESSIVE DISORDER, RECURRENT EPISODE, MODERATE (HCC): Status: RESOLVED | Noted: 2018-06-28 | Resolved: 2025-07-21

## 2025-07-21 PROCEDURE — 99213 OFFICE O/P EST LOW 20 MIN: CPT | Performed by: STUDENT IN AN ORGANIZED HEALTH CARE EDUCATION/TRAINING PROGRAM

## 2025-07-21 SDOH — ECONOMIC STABILITY: FOOD INSECURITY: WITHIN THE PAST 12 MONTHS, YOU WORRIED THAT YOUR FOOD WOULD RUN OUT BEFORE YOU GOT MONEY TO BUY MORE.: NEVER TRUE

## 2025-07-21 SDOH — ECONOMIC STABILITY: FOOD INSECURITY: WITHIN THE PAST 12 MONTHS, THE FOOD YOU BOUGHT JUST DIDN'T LAST AND YOU DIDN'T HAVE MONEY TO GET MORE.: NEVER TRUE

## 2025-07-21 ASSESSMENT — PATIENT HEALTH QUESTIONNAIRE - PHQ9
SUM OF ALL RESPONSES TO PHQ QUESTIONS 1-9: 4
SUM OF ALL RESPONSES TO PHQ QUESTIONS 1-9: 4
8. MOVING OR SPEAKING SO SLOWLY THAT OTHER PEOPLE COULD HAVE NOTICED. OR THE OPPOSITE - BEING SO FIDGETY OR RESTLESS THAT YOU HAVE BEEN MOVING AROUND A LOT MORE THAN USUAL: NOT AT ALL
5. POOR APPETITE OR OVEREATING: NOT AT ALL
10. IF YOU CHECKED OFF ANY PROBLEMS, HOW DIFFICULT HAVE THESE PROBLEMS MADE IT FOR YOU TO DO YOUR WORK, TAKE CARE OF THINGS AT HOME, OR GET ALONG WITH OTHER PEOPLE: NOT DIFFICULT AT ALL
SUM OF ALL RESPONSES TO PHQ QUESTIONS 1-9: 4
10. IF YOU CHECKED OFF ANY PROBLEMS, HOW DIFFICULT HAVE THESE PROBLEMS MADE IT FOR YOU TO DO YOUR WORK, TAKE CARE OF THINGS AT HOME, OR GET ALONG WITH OTHER PEOPLE: NOT DIFFICULT AT ALL
9. THOUGHTS THAT YOU WOULD BE BETTER OFF DEAD, OR OF HURTING YOURSELF: NOT AT ALL
9. THOUGHTS THAT YOU WOULD BE BETTER OFF DEAD, OR OF HURTING YOURSELF: NOT AT ALL
3. TROUBLE FALLING OR STAYING ASLEEP: NOT AT ALL
SUM OF ALL RESPONSES TO PHQ QUESTIONS 1-9: 4
7. TROUBLE CONCENTRATING ON THINGS, SUCH AS READING THE NEWSPAPER OR WATCHING TELEVISION: NOT AT ALL
7. TROUBLE CONCENTRATING ON THINGS, SUCH AS READING THE NEWSPAPER OR WATCHING TELEVISION: NOT AT ALL
1. LITTLE INTEREST OR PLEASURE IN DOING THINGS: NEARLY EVERY DAY
4. FEELING TIRED OR HAVING LITTLE ENERGY: NOT AT ALL
1. LITTLE INTEREST OR PLEASURE IN DOING THINGS: NEARLY EVERY DAY
1. LITTLE INTEREST OR PLEASURE IN DOING THINGS: NEARLY EVERY DAY
8. MOVING OR SPEAKING SO SLOWLY THAT OTHER PEOPLE COULD HAVE NOTICED. OR THE OPPOSITE, BEING SO FIGETY OR RESTLESS THAT YOU HAVE BEEN MOVING AROUND A LOT MORE THAN USUAL: NOT AT ALL
4. FEELING TIRED OR HAVING LITTLE ENERGY: NOT AT ALL
SUM OF ALL RESPONSES TO PHQ QUESTIONS 1-9: 4
4. FEELING TIRED OR HAVING LITTLE ENERGY: NOT AT ALL
9. THOUGHTS THAT YOU WOULD BE BETTER OFF DEAD, OR OF HURTING YOURSELF: NOT AT ALL
2. FEELING DOWN, DEPRESSED OR HOPELESS: NOT AT ALL
SUM OF ALL RESPONSES TO PHQ QUESTIONS 1-9: 4
3. TROUBLE FALLING OR STAYING ASLEEP: NOT AT ALL
SUM OF ALL RESPONSES TO PHQ QUESTIONS 1-9: 4
2. FEELING DOWN, DEPRESSED OR HOPELESS: NOT AT ALL
6. FEELING BAD ABOUT YOURSELF - OR THAT YOU ARE A FAILURE OR HAVE LET YOURSELF OR YOUR FAMILY DOWN: SEVERAL DAYS
SUM OF ALL RESPONSES TO PHQ QUESTIONS 1-9: 4
6. FEELING BAD ABOUT YOURSELF - OR THAT YOU ARE A FAILURE OR HAVE LET YOURSELF OR YOUR FAMILY DOWN: SEVERAL DAYS
10. IF YOU CHECKED OFF ANY PROBLEMS, HOW DIFFICULT HAVE THESE PROBLEMS MADE IT FOR YOU TO DO YOUR WORK, TAKE CARE OF THINGS AT HOME, OR GET ALONG WITH OTHER PEOPLE: NOT DIFFICULT AT ALL
SUM OF ALL RESPONSES TO PHQ QUESTIONS 1-9: 4
2. FEELING DOWN, DEPRESSED OR HOPELESS: NOT AT ALL
3. TROUBLE FALLING OR STAYING ASLEEP: NOT AT ALL
5. POOR APPETITE OR OVEREATING: NOT AT ALL
7. TROUBLE CONCENTRATING ON THINGS, SUCH AS READING THE NEWSPAPER OR WATCHING TELEVISION: NOT AT ALL
5. POOR APPETITE OR OVEREATING: NOT AT ALL
6. FEELING BAD ABOUT YOURSELF - OR THAT YOU ARE A FAILURE OR HAVE LET YOURSELF OR YOUR FAMILY DOWN: SEVERAL DAYS
8. MOVING OR SPEAKING SO SLOWLY THAT OTHER PEOPLE COULD HAVE NOTICED. OR THE OPPOSITE, BEING SO FIGETY OR RESTLESS THAT YOU HAVE BEEN MOVING AROUND A LOT MORE THAN USUAL: NOT AT ALL

## 2025-07-21 NOTE — PROGRESS NOTES
Simón Aceves (:  1990) is a 35 y.o. male, here for evaluation of the following chief complaint(s):  Annual Exam, Rash (Ankles and elbows ), and Skin Tag         1. Skin tag  2. Skin rash    Assessment & Plan  1. Rash.  - Rash on ankles and elbows has significantly improved over the past 3 weeks.  - Likely due to heat, as it has mostly resolved.  No definitive rash present on exam  - No further intervention required at this time.    2. Skin tags.  - Single skin tag present on ventral aspect of penis without any complication, no unsafe sexual activity or discharge noted  - Benign in nature, no immediate intervention needed.  - Advised to monitor for changes such as ulceration        Results    No follow-ups on file.       Subjective   History of Present Illness  The patient presents for evaluation of a rash on his ankles and elbows, skin tags, and STD screening.    He reports the presence of a rash on his ankles and elbows, which has been present for approximately 3 weeks. The rash has shown significant improvement on both his ankles and elbows. He is uncertain about the cause of the rash but mentions that it was mildly itchy and extended up his leg on both sides.    Additionally, he has noticed a skin tag on his penis, which prompted him to schedule this appointment. He also reports the presence of similar skin tags in his armpit.    Marital Status:   Sexual Practices: Monogamous with wife for the past 2 years, plans to start dating again    Review of Systems     Chief Complaint   Patient presents with    Annual Exam    Rash     Ankles and elbows     Skin Tag     He is a 35 y.o. male who presents for evalution.     Reviewed PmHx, RxHx, FmHx, SocHx, AllgHx and updated and dated in the chart.    CURRENT MEDS W/ ASSOC DIAG    No medications reported.          Patient Active Problem List   Diagnosis Code    RAHUL (generalized anxiety disorder) F41.1    Vitamin D deficiency E55.9    GERD